# Patient Record
Sex: FEMALE | Employment: OTHER
[De-identification: names, ages, dates, MRNs, and addresses within clinical notes are randomized per-mention and may not be internally consistent; named-entity substitution may affect disease eponyms.]

---

## 2024-06-20 ENCOUNTER — APPOINTMENT (OUTPATIENT)
Facility: HOSPITAL | Age: 89
DRG: 065 | End: 2024-06-20
Payer: MEDICARE

## 2024-06-20 ENCOUNTER — HOSPITAL ENCOUNTER (EMERGENCY)
Facility: HOSPITAL | Age: 89
Discharge: HOME | DRG: 065 | End: 2024-06-20
Attending: EMERGENCY MEDICINE
Payer: MEDICARE

## 2024-06-20 VITALS
DIASTOLIC BLOOD PRESSURE: 65 MMHG | RESPIRATION RATE: 27 BRPM | TEMPERATURE: 97.9 F | OXYGEN SATURATION: 95 % | HEART RATE: 60 BPM | WEIGHT: 117 LBS | BODY MASS INDEX: 27.08 KG/M2 | HEIGHT: 55 IN | SYSTOLIC BLOOD PRESSURE: 194 MMHG

## 2024-06-20 DIAGNOSIS — S00.91XA ABRASION OF HEAD, INITIAL ENCOUNTER: ICD-10-CM

## 2024-06-20 DIAGNOSIS — E86.0 DEHYDRATION: ICD-10-CM

## 2024-06-20 DIAGNOSIS — S09.90XA CLOSED HEAD INJURY, INITIAL ENCOUNTER: Primary | ICD-10-CM

## 2024-06-20 DIAGNOSIS — I10 ESSENTIAL HYPERTENSION: ICD-10-CM

## 2024-06-20 LAB
ALBUMIN SERPL-MCNC: 3.5 G/DL (ref 3.5–5)
ALBUMIN/GLOB SERPL: 0.8 (ref 1.1–2.2)
ALP SERPL-CCNC: 64 U/L (ref 45–117)
ALT SERPL-CCNC: 20 U/L (ref 12–78)
ANION GAP SERPL CALC-SCNC: 5 MMOL/L (ref 5–15)
APPEARANCE UR: CLEAR
AST SERPL-CCNC: 19 U/L (ref 15–37)
BACTERIA URNS QL MICRO: NEGATIVE /HPF
BASOPHILS # BLD: 0.1 K/UL (ref 0–0.1)
BASOPHILS NFR BLD: 1 % (ref 0–1)
BILIRUB SERPL-MCNC: 0.4 MG/DL (ref 0.2–1)
BILIRUB UR QL: NEGATIVE
BUN SERPL-MCNC: 30 MG/DL (ref 6–20)
BUN/CREAT SERPL: 21 (ref 12–20)
CALCIUM SERPL-MCNC: 9.5 MG/DL (ref 8.5–10.1)
CHLORIDE SERPL-SCNC: 102 MMOL/L (ref 97–108)
CO2 SERPL-SCNC: 29 MMOL/L (ref 21–32)
COLOR UR: ABNORMAL
CREAT SERPL-MCNC: 1.42 MG/DL (ref 0.55–1.02)
DIFFERENTIAL METHOD BLD: ABNORMAL
EOSINOPHIL # BLD: 0.1 K/UL (ref 0–0.4)
EOSINOPHIL NFR BLD: 1 % (ref 0–7)
EPITH CASTS URNS QL MICRO: ABNORMAL /LPF
ERYTHROCYTE [DISTWIDTH] IN BLOOD BY AUTOMATED COUNT: 13.3 % (ref 11.5–14.5)
GLOBULIN SER CALC-MCNC: 4.3 G/DL (ref 2–4)
GLUCOSE SERPL-MCNC: 121 MG/DL (ref 65–100)
GLUCOSE UR STRIP.AUTO-MCNC: NEGATIVE MG/DL
HCT VFR BLD AUTO: 36.3 % (ref 35–47)
HGB BLD-MCNC: 11.8 G/DL (ref 11.5–16)
HGB UR QL STRIP: ABNORMAL
HYALINE CASTS URNS QL MICRO: ABNORMAL /LPF (ref 0–2)
IMM GRANULOCYTES # BLD AUTO: 0.1 K/UL (ref 0–0.04)
IMM GRANULOCYTES NFR BLD AUTO: 1 % (ref 0–0.5)
KETONES UR QL STRIP.AUTO: NEGATIVE MG/DL
LEUKOCYTE ESTERASE UR QL STRIP.AUTO: NEGATIVE
LYMPHOCYTES # BLD: 1 K/UL (ref 0.8–3.5)
LYMPHOCYTES NFR BLD: 13 % (ref 12–49)
MCH RBC QN AUTO: 28.2 PG (ref 26–34)
MCHC RBC AUTO-ENTMCNC: 32.5 G/DL (ref 30–36.5)
MCV RBC AUTO: 86.6 FL (ref 80–99)
MONOCYTES # BLD: 0.6 K/UL (ref 0–1)
MONOCYTES NFR BLD: 7 % (ref 5–13)
NEUTS SEG # BLD: 6.3 K/UL (ref 1.8–8)
NEUTS SEG NFR BLD: 77 % (ref 32–75)
NITRITE UR QL STRIP.AUTO: NEGATIVE
NRBC # BLD: 0 K/UL (ref 0–0.01)
NRBC BLD-RTO: 0 PER 100 WBC
PH UR STRIP: 7 (ref 5–8)
PLATELET # BLD AUTO: 298 K/UL (ref 150–400)
PMV BLD AUTO: 9.5 FL (ref 8.9–12.9)
POTASSIUM SERPL-SCNC: 4.4 MMOL/L (ref 3.5–5.1)
PROT SERPL-MCNC: 7.8 G/DL (ref 6.4–8.2)
PROT UR STRIP-MCNC: ABNORMAL MG/DL
RBC # BLD AUTO: 4.19 M/UL (ref 3.8–5.2)
RBC #/AREA URNS HPF: ABNORMAL /HPF (ref 0–5)
SODIUM SERPL-SCNC: 136 MMOL/L (ref 136–145)
SP GR UR REFRACTOMETRY: 1.01
URINE CULTURE IF INDICATED: ABNORMAL
UROBILINOGEN UR QL STRIP.AUTO: 0.2 EU/DL (ref 0.2–1)
WBC # BLD AUTO: 8.1 K/UL (ref 3.6–11)
WBC URNS QL MICRO: ABNORMAL /HPF (ref 0–4)

## 2024-06-20 PROCEDURE — 85025 COMPLETE CBC W/AUTO DIFF WBC: CPT

## 2024-06-20 PROCEDURE — 70450 CT HEAD/BRAIN W/O DYE: CPT

## 2024-06-20 PROCEDURE — 81001 URINALYSIS AUTO W/SCOPE: CPT

## 2024-06-20 PROCEDURE — 80053 COMPREHEN METABOLIC PANEL: CPT

## 2024-06-20 PROCEDURE — 96361 HYDRATE IV INFUSION ADD-ON: CPT

## 2024-06-20 PROCEDURE — 6370000000 HC RX 637 (ALT 250 FOR IP): Performed by: EMERGENCY MEDICINE

## 2024-06-20 PROCEDURE — 36415 COLL VENOUS BLD VENIPUNCTURE: CPT

## 2024-06-20 PROCEDURE — 2580000003 HC RX 258: Performed by: EMERGENCY MEDICINE

## 2024-06-20 PROCEDURE — 96360 HYDRATION IV INFUSION INIT: CPT

## 2024-06-20 PROCEDURE — 99284 EMERGENCY DEPT VISIT MOD MDM: CPT

## 2024-06-20 RX ORDER — IBUPROFEN 600 MG/1
600 TABLET ORAL
Status: COMPLETED | OUTPATIENT
Start: 2024-06-20 | End: 2024-06-20

## 2024-06-20 RX ORDER — 0.9 % SODIUM CHLORIDE 0.9 %
500 INTRAVENOUS SOLUTION INTRAVENOUS ONCE
Status: COMPLETED | OUTPATIENT
Start: 2024-06-20 | End: 2024-06-20

## 2024-06-20 RX ADMIN — SODIUM CHLORIDE 500 ML: 9 INJECTION, SOLUTION INTRAVENOUS at 14:38

## 2024-06-20 RX ADMIN — IBUPROFEN 600 MG: 600 TABLET, FILM COATED ORAL at 14:39

## 2024-06-20 ASSESSMENT — PAIN - FUNCTIONAL ASSESSMENT: PAIN_FUNCTIONAL_ASSESSMENT: 0-10

## 2024-06-20 ASSESSMENT — PAIN SCALES - GENERAL: PAINLEVEL_OUTOF10: 4

## 2024-06-20 NOTE — ED NOTES
Patient discharged from the ED by Adilene MAJANO. Diagnosis, medications, precautions and follow-ups were reviewed with the patient/family. Questions were asked and answered prior to departure. Patient departed the ED via wheelchair and was accompanied by daughter.

## 2024-06-20 NOTE — ED PROVIDER NOTES
[facility] EMERGENCY DEPT  EMERGENCY DEPARTMENT ENCOUNTER    Patient Name: Jeni Melara  MRN: [id number]  YOB: 1928  Provider: Yaakov Head MD  PCP: Unknown, Provider, APRN - NP  Time/Date of evaluation: 2:17 PM EDT on 6/20/24    History of Presenting Illness     Chief Complaint   Patient presents with    Fall     Pt BIBEMS with a cc of fall this morning; pt hit head while falling and complaining of right knee pain; denies blood thinners      History Provided by: Patient and patient's granddaughter    History is limited by: Nothing    HISTORY (Narrative):   Jeni Melara is a 89 y.o. female with a PMHX of hypertension  who presents to the emergency department (room 9) by EMS C/O ground-level fall that occurred this morning.  Patient was out on her balcony eating her birds and fell and hit her forehead.  She denies any loss of consciousness.  She also complains of right knee pain but denies any wounds.  She takes her olmesartan and trazodone as prescribed.  She does not have a primary care but has an appointment in September to see a new PCP.    Nursing Notes were all reviewed and agreed with or any disagreements were addressed in the HPI.    Past History     PAST MEDICAL HISTORY:  No past medical history on file.    PAST SURGICAL HISTORY:  No past surgical history on file.    FAMILY HISTORY:  No family history on file.    SOCIAL HISTORY:       MEDICATIONS:  No current facility-administered medications on file prior to encounter.     No current outpatient medications on file prior to encounter.       ALLERGIES:  No Known Allergies    SOCIAL DETERMINANTS OF HEALTH:  Social Determinants of Health     Tobacco Use: Not on file   Alcohol Use: Not on file   Financial Resource Strain: Not on file   Food Insecurity: Not on file   Transportation Needs: Not on file   Physical Activity: Not on file   Stress: Not on file   Social Connections: Not on file   Intimate Partner Violence: Not on file   Depression: Not on file  MD    DISCUSSION:  This appears to be a severe conditon.  This appears to be an acute condition.    89 y.o. female presents after a ground-level fall at [facility] (assisted living facility).  Patient complains of mild head pain and right knee pain secondary to her ground-level fall.  She states she was outside putting bird seed in her bird feeder when she fell.  She denies any chest pain, shortness of breath, palpitations, or any prodromal symptoms.  Given her age and elevated blood pressure readings, will obtain a CBC, CMP, and urinalysis to assess for anemia, infectious etiologies, dehydration/acute renal injury, and UTI respectively.  Will also obtain a CT of the head given her hematoma and reported fall.  Will treat with IV fluids and Motrin and reassess.  The decision to perform testing and results were discussed with the patient and granddaughter . I discussed each of these tests and considerations with the patient and granddaughter . Patient and granddaughter feel that she is well enough for discharge and agree with the plan of discharge and outpatient follow-up.    ADDITIONAL CONSIDERATIONS:  Medications considered in the ED but not provided include: IV antihypertensives because patient states she had taken her blood pressure medications earlier today and refused any additional blood pressure medications in the ED.  Admission was considered, but patient refused because she felt well enough for discharge.  With a negative workup, her granddaughter feels comfortable taking her home and both express understanding of return precautions.  Procedures/Critical Care     Procedures    ED FINAL IMPRESSION     1. Closed head injury, initial encounter    2. Dehydration    3. Abrasion of head, initial encounter    4. Essential hypertension      DISPOSITION/PLAN     DISPOSITION Decision To Discharge 06/20/2024 04:22:01 PM   Discharge Note: The patient is stable for discharge home. The signs, symptoms, diagnosis, and

## 2024-06-20 NOTE — ED NOTES
Pt arrives to ED via EMS with a cc of head pain, right hematoma and right knee pain secondary to GLF; pt states she was outside feeding the birds when she started to feel different and fell; pt lives at [facility]; pt is alert and oriented x 4, resting in stretcher with side rails up and call bell within reach. Granddaughter at bedside.

## 2024-06-21 ENCOUNTER — HOSPITAL ENCOUNTER (INPATIENT)
Facility: HOSPITAL | Age: 89
LOS: 3 days | Discharge: NURSING, REHAB OR LONG-TERM CARE | DRG: 065 | End: 2024-06-26
Attending: STUDENT IN AN ORGANIZED HEALTH CARE EDUCATION/TRAINING PROGRAM | Admitting: INTERNAL MEDICINE
Payer: MEDICARE

## 2024-06-21 ENCOUNTER — APPOINTMENT (OUTPATIENT)
Facility: HOSPITAL | Age: 89
DRG: 065 | End: 2024-06-21
Payer: MEDICARE

## 2024-06-21 DIAGNOSIS — R53.1 GENERAL WEAKNESS: ICD-10-CM

## 2024-06-21 DIAGNOSIS — I63.9 CEREBROVASCULAR ACCIDENT (CVA), UNSPECIFIED MECHANISM (HCC): Primary | ICD-10-CM

## 2024-06-21 PROBLEM — R54 AGE-RELATED PHYSICAL DEBILITY: Status: ACTIVE | Noted: 2024-06-21

## 2024-06-21 LAB
ALBUMIN SERPL-MCNC: 3.3 G/DL (ref 3.5–5)
ALBUMIN/GLOB SERPL: 0.7 (ref 1.1–2.2)
ALP SERPL-CCNC: 60 U/L (ref 45–117)
ALT SERPL-CCNC: 20 U/L (ref 12–78)
ANION GAP SERPL CALC-SCNC: 5 MMOL/L (ref 5–15)
APPEARANCE UR: CLEAR
AST SERPL-CCNC: 18 U/L (ref 15–37)
BACTERIA URNS QL MICRO: NEGATIVE /HPF
BASOPHILS # BLD: 0.1 K/UL (ref 0–0.1)
BASOPHILS NFR BLD: 1 % (ref 0–1)
BILIRUB SERPL-MCNC: 0.5 MG/DL (ref 0.2–1)
BILIRUB UR QL: NEGATIVE
BUN SERPL-MCNC: 22 MG/DL (ref 6–20)
BUN/CREAT SERPL: 19 (ref 12–20)
CALCIUM SERPL-MCNC: 9.3 MG/DL (ref 8.5–10.1)
CHLORIDE SERPL-SCNC: 102 MMOL/L (ref 97–108)
CO2 SERPL-SCNC: 27 MMOL/L (ref 21–32)
COLOR UR: ABNORMAL
COMMENT:: NORMAL
CREAT SERPL-MCNC: 1.13 MG/DL (ref 0.55–1.02)
DIFFERENTIAL METHOD BLD: ABNORMAL
EOSINOPHIL # BLD: 0.1 K/UL (ref 0–0.4)
EOSINOPHIL NFR BLD: 1 % (ref 0–7)
EPITH CASTS URNS QL MICRO: ABNORMAL /LPF
ERYTHROCYTE [DISTWIDTH] IN BLOOD BY AUTOMATED COUNT: 13.2 % (ref 11.5–14.5)
GLOBULIN SER CALC-MCNC: 4.5 G/DL (ref 2–4)
GLUCOSE BLD STRIP.AUTO-MCNC: 127 MG/DL (ref 65–117)
GLUCOSE SERPL-MCNC: 106 MG/DL (ref 65–100)
GLUCOSE UR STRIP.AUTO-MCNC: NEGATIVE MG/DL
HCT VFR BLD AUTO: 34.7 % (ref 35–47)
HGB BLD-MCNC: 12 G/DL (ref 11.5–16)
HGB UR QL STRIP: ABNORMAL
HYALINE CASTS URNS QL MICRO: ABNORMAL /LPF (ref 0–2)
IMM GRANULOCYTES # BLD AUTO: 0.1 K/UL (ref 0–0.04)
IMM GRANULOCYTES NFR BLD AUTO: 1 % (ref 0–0.5)
KETONES UR QL STRIP.AUTO: NEGATIVE MG/DL
LEUKOCYTE ESTERASE UR QL STRIP.AUTO: NEGATIVE
LYMPHOCYTES # BLD: 1.5 K/UL (ref 0.8–3.5)
LYMPHOCYTES NFR BLD: 17 % (ref 12–49)
MAGNESIUM SERPL-MCNC: 2 MG/DL (ref 1.6–2.4)
MCH RBC QN AUTO: 28.4 PG (ref 26–34)
MCHC RBC AUTO-ENTMCNC: 34.6 G/DL (ref 30–36.5)
MCV RBC AUTO: 82.2 FL (ref 80–99)
MONOCYTES # BLD: 0.6 K/UL (ref 0–1)
MONOCYTES NFR BLD: 7 % (ref 5–13)
NEUTS SEG # BLD: 6.3 K/UL (ref 1.8–8)
NEUTS SEG NFR BLD: 74 % (ref 32–75)
NITRITE UR QL STRIP.AUTO: NEGATIVE
NRBC # BLD: 0 K/UL (ref 0–0.01)
NRBC BLD-RTO: 0 PER 100 WBC
PH UR STRIP: 7.5 (ref 5–8)
PLATELET # BLD AUTO: 280 K/UL (ref 150–400)
PMV BLD AUTO: 9.3 FL (ref 8.9–12.9)
POTASSIUM SERPL-SCNC: 4.4 MMOL/L (ref 3.5–5.1)
PROT SERPL-MCNC: 7.8 G/DL (ref 6.4–8.2)
PROT UR STRIP-MCNC: ABNORMAL MG/DL
RBC # BLD AUTO: 4.22 M/UL (ref 3.8–5.2)
RBC #/AREA URNS HPF: ABNORMAL /HPF (ref 0–5)
SERVICE CMNT-IMP: ABNORMAL
SODIUM SERPL-SCNC: 134 MMOL/L (ref 136–145)
SP GR UR REFRACTOMETRY: 1.02
SPECIMEN HOLD: NORMAL
TROPONIN I SERPL HS-MCNC: 39 NG/L (ref 0–51)
URINE CULTURE IF INDICATED: ABNORMAL
UROBILINOGEN UR QL STRIP.AUTO: 0.2 EU/DL (ref 0.2–1)
WBC # BLD AUTO: 8.5 K/UL (ref 3.6–11)
WBC URNS QL MICRO: ABNORMAL /HPF (ref 0–4)

## 2024-06-21 PROCEDURE — 36415 COLL VENOUS BLD VENIPUNCTURE: CPT

## 2024-06-21 PROCEDURE — 96361 HYDRATE IV INFUSION ADD-ON: CPT

## 2024-06-21 PROCEDURE — 85025 COMPLETE CBC W/AUTO DIFF WBC: CPT

## 2024-06-21 PROCEDURE — 93005 ELECTROCARDIOGRAM TRACING: CPT | Performed by: INTERNAL MEDICINE

## 2024-06-21 PROCEDURE — 70450 CT HEAD/BRAIN W/O DYE: CPT

## 2024-06-21 PROCEDURE — 6360000004 HC RX CONTRAST MEDICATION: Performed by: STUDENT IN AN ORGANIZED HEALTH CARE EDUCATION/TRAINING PROGRAM

## 2024-06-21 PROCEDURE — 74176 CT ABD & PELVIS W/O CONTRAST: CPT

## 2024-06-21 PROCEDURE — 71045 X-RAY EXAM CHEST 1 VIEW: CPT

## 2024-06-21 PROCEDURE — 83735 ASSAY OF MAGNESIUM: CPT

## 2024-06-21 PROCEDURE — 2580000003 HC RX 258: Performed by: NURSE PRACTITIONER

## 2024-06-21 PROCEDURE — 71275 CT ANGIOGRAPHY CHEST: CPT

## 2024-06-21 PROCEDURE — 84484 ASSAY OF TROPONIN QUANT: CPT

## 2024-06-21 PROCEDURE — G0378 HOSPITAL OBSERVATION PER HR: HCPCS

## 2024-06-21 PROCEDURE — 2580000003 HC RX 258: Performed by: STUDENT IN AN ORGANIZED HEALTH CARE EDUCATION/TRAINING PROGRAM

## 2024-06-21 PROCEDURE — 99285 EMERGENCY DEPT VISIT HI MDM: CPT

## 2024-06-21 PROCEDURE — 80053 COMPREHEN METABOLIC PANEL: CPT

## 2024-06-21 PROCEDURE — 82962 GLUCOSE BLOOD TEST: CPT

## 2024-06-21 PROCEDURE — 81001 URINALYSIS AUTO W/SCOPE: CPT

## 2024-06-21 RX ORDER — POLYETHYLENE GLYCOL 3350 17 G/17G
17 POWDER, FOR SOLUTION ORAL DAILY PRN
Status: DISCONTINUED | OUTPATIENT
Start: 2024-06-21 | End: 2024-06-22 | Stop reason: SDUPTHER

## 2024-06-21 RX ORDER — TRAZODONE HYDROCHLORIDE 50 MG/1
50 TABLET ORAL NIGHTLY
COMMUNITY

## 2024-06-21 RX ORDER — SODIUM CHLORIDE 9 MG/ML
INJECTION, SOLUTION INTRAVENOUS CONTINUOUS
Status: ACTIVE | OUTPATIENT
Start: 2024-06-21 | End: 2024-06-22

## 2024-06-21 RX ORDER — ONDANSETRON 4 MG/1
4 TABLET, ORALLY DISINTEGRATING ORAL EVERY 8 HOURS PRN
Status: DISCONTINUED | OUTPATIENT
Start: 2024-06-21 | End: 2024-06-22 | Stop reason: SDUPTHER

## 2024-06-21 RX ORDER — ACETAMINOPHEN 325 MG/1
650 TABLET ORAL EVERY 6 HOURS PRN
Status: DISCONTINUED | OUTPATIENT
Start: 2024-06-21 | End: 2024-06-26 | Stop reason: HOSPADM

## 2024-06-21 RX ORDER — GLUCAGON 1 MG/ML
1 KIT INJECTION PRN
Status: DISCONTINUED | OUTPATIENT
Start: 2024-06-21 | End: 2024-06-26 | Stop reason: HOSPADM

## 2024-06-21 RX ORDER — OLMESARTAN MEDOXOMIL 20 MG/1
20 TABLET ORAL DAILY
Status: ON HOLD | COMMUNITY
Start: 2024-06-03 | End: 2024-06-26 | Stop reason: HOSPADM

## 2024-06-21 RX ORDER — 0.9 % SODIUM CHLORIDE 0.9 %
1000 INTRAVENOUS SOLUTION INTRAVENOUS ONCE
Status: COMPLETED | OUTPATIENT
Start: 2024-06-21 | End: 2024-06-21

## 2024-06-21 RX ORDER — TRAZODONE HYDROCHLORIDE 50 MG/1
50 TABLET ORAL NIGHTLY
Status: DISCONTINUED | OUTPATIENT
Start: 2024-06-21 | End: 2024-06-26 | Stop reason: HOSPADM

## 2024-06-21 RX ORDER — SODIUM CHLORIDE 9 MG/ML
INJECTION, SOLUTION INTRAVENOUS PRN
Status: DISCONTINUED | OUTPATIENT
Start: 2024-06-21 | End: 2024-06-23 | Stop reason: SDUPTHER

## 2024-06-21 RX ORDER — DEXTROSE MONOHYDRATE 100 MG/ML
INJECTION, SOLUTION INTRAVENOUS CONTINUOUS PRN
Status: DISCONTINUED | OUTPATIENT
Start: 2024-06-21 | End: 2024-06-26 | Stop reason: HOSPADM

## 2024-06-21 RX ORDER — ACETAMINOPHEN 650 MG/1
650 SUPPOSITORY RECTAL EVERY 6 HOURS PRN
Status: DISCONTINUED | OUTPATIENT
Start: 2024-06-21 | End: 2024-06-26 | Stop reason: HOSPADM

## 2024-06-21 RX ORDER — ACETAMINOPHEN 325 MG/1
650 TABLET ORAL EVERY 8 HOURS SCHEDULED
Status: DISCONTINUED | OUTPATIENT
Start: 2024-06-21 | End: 2024-06-26 | Stop reason: HOSPADM

## 2024-06-21 RX ORDER — ENOXAPARIN SODIUM 100 MG/ML
30 INJECTION SUBCUTANEOUS DAILY
Status: DISCONTINUED | OUTPATIENT
Start: 2024-06-22 | End: 2024-06-25

## 2024-06-21 RX ORDER — ONDANSETRON 2 MG/ML
4 INJECTION INTRAMUSCULAR; INTRAVENOUS EVERY 6 HOURS PRN
Status: DISCONTINUED | OUTPATIENT
Start: 2024-06-21 | End: 2024-06-22 | Stop reason: SDUPTHER

## 2024-06-21 RX ORDER — LOSARTAN POTASSIUM 50 MG/1
50 TABLET ORAL DAILY
Status: DISCONTINUED | OUTPATIENT
Start: 2024-06-22 | End: 2024-06-22

## 2024-06-21 RX ORDER — SODIUM CHLORIDE 0.9 % (FLUSH) 0.9 %
5-40 SYRINGE (ML) INJECTION PRN
Status: DISCONTINUED | OUTPATIENT
Start: 2024-06-21 | End: 2024-06-22 | Stop reason: SDUPTHER

## 2024-06-21 RX ORDER — SODIUM CHLORIDE 0.9 % (FLUSH) 0.9 %
5-40 SYRINGE (ML) INJECTION EVERY 12 HOURS SCHEDULED
Status: DISCONTINUED | OUTPATIENT
Start: 2024-06-21 | End: 2024-06-22 | Stop reason: SDUPTHER

## 2024-06-21 RX ORDER — INSULIN LISPRO 100 [IU]/ML
0-4 INJECTION, SOLUTION INTRAVENOUS; SUBCUTANEOUS
Status: DISCONTINUED | OUTPATIENT
Start: 2024-06-22 | End: 2024-06-26 | Stop reason: HOSPADM

## 2024-06-21 RX ORDER — INSULIN LISPRO 100 [IU]/ML
0-4 INJECTION, SOLUTION INTRAVENOUS; SUBCUTANEOUS NIGHTLY
Status: DISCONTINUED | OUTPATIENT
Start: 2024-06-21 | End: 2024-06-26 | Stop reason: HOSPADM

## 2024-06-21 RX ADMIN — SODIUM CHLORIDE 1000 ML: 9 INJECTION, SOLUTION INTRAVENOUS at 18:27

## 2024-06-21 RX ADMIN — IOPAMIDOL 100 ML: 755 INJECTION, SOLUTION INTRAVENOUS at 19:03

## 2024-06-21 RX ADMIN — SODIUM CHLORIDE: 9 INJECTION, SOLUTION INTRAVENOUS at 22:29

## 2024-06-21 ASSESSMENT — LIFESTYLE VARIABLES
HOW MANY STANDARD DRINKS CONTAINING ALCOHOL DO YOU HAVE ON A TYPICAL DAY: PATIENT DOES NOT DRINK
HOW OFTEN DO YOU HAVE A DRINK CONTAINING ALCOHOL: NEVER

## 2024-06-21 NOTE — ED PROVIDER NOTES
201/80   Pulse: 66 66 68 70   Resp: 17 22 19 17   Temp:       TempSrc:       SpO2: 95% 96% 96% 94%   Weight:       Height:                Patient was given the following medications:  Medications   sodium chloride 0.9 % bolus 1,000 mL (1,000 mLs IntraVENous New Bag 6/21/24 7697)   iopamidol (ISOVUE-370) 76 % injection 100 mL (has no administration in time range)       CONSULTS: (Who and What was discussed)  None      Chronic Conditions: htn    Social Determinants affecting Dx or Tx: None  =    Records Reviewed (source and summary of external notes): Nursing Notes, Previous Radiology Studies, and Previous Laboratory Studies    CC/HPI Summary, DDx, ED Course, and Reassessment: Patient presenting with nonspecific, nonfocal weakness, generalized fatigue. DDx: medication toxicity, infection, anemia, electrolyte/metabolic anomoly, hypercapnea, stroke/bleed/mass, dehydration, illicit drug intoxication. Will obtain labwork, UA, EKG and CT imaging of the head, Chest xray.  Labs CT abdomen given her complaint of abdominal distention although suspect this is likely a chronic issue.  Will admit to hospitalist due to difficulty ambulating, generalized weakness.    ED Course as of 06/23/24 1154   Fri Jun 21, 2024 2157 Discussed with hospitalist. Workup thus far unremarkable other than incidental thoracoabdominal anuersym, no evidence of dissection or rupture. Updated family. Derian lives in independent living and family uncofmortable with her going back at this time.  [NM]      ED Course User Index  [NM] Rhys Dan, DO       Disposition Considerations (Tests not done, Shared Decision Making, Pt Expectation of Test or Tx.):      FINAL IMPRESSION     1. Cerebrovascular accident (CVA), unspecified mechanism (HCC)    2. General weakness          DISPOSITION/PLAN   DISPOSITION        Admit Note: Pt is being admitted by Dr Khan. The results of their tests and reason(s) for their admission have been discussed with pt  and/or available family. They convey agreement and understanding for the need to be admitted and for the admission diagnosis.     PATIENT REFERRED TO:  No follow-up provider specified.       DISCHARGE MEDICATIONS:     Medication List      You have not been prescribed any medications.           DISCONTINUED MEDICATIONS:  There are no discharge medications for this patient.      I am the Primary Clinician of Record.   Rhys Dan DO (electronically signed)      (Please note that parts of this dictation were completed with voice recognition software. Quite often unanticipated grammatical, syntax, homophones, and other interpretive errors are inadvertently transcribed by the computer software. Please disregards these errors. Please excuse any errors that have escaped final proofreading.)         Rhys Dan DO  06/23/24 1156

## 2024-06-22 ENCOUNTER — APPOINTMENT (OUTPATIENT)
Facility: HOSPITAL | Age: 89
DRG: 065 | End: 2024-06-22
Payer: MEDICARE

## 2024-06-22 LAB
ALBUMIN SERPL-MCNC: 3 G/DL (ref 3.5–5)
ALBUMIN/GLOB SERPL: 0.8 (ref 1.1–2.2)
ALP SERPL-CCNC: 51 U/L (ref 45–117)
ALT SERPL-CCNC: 16 U/L (ref 12–78)
ANION GAP SERPL CALC-SCNC: 6 MMOL/L (ref 5–15)
AST SERPL-CCNC: 16 U/L (ref 15–37)
BASOPHILS # BLD: 0 K/UL (ref 0–0.1)
BASOPHILS NFR BLD: 1 % (ref 0–1)
BILIRUB SERPL-MCNC: 0.7 MG/DL (ref 0.2–1)
BUN SERPL-MCNC: 20 MG/DL (ref 6–20)
BUN/CREAT SERPL: 18 (ref 12–20)
CALCIUM SERPL-MCNC: 8.5 MG/DL (ref 8.5–10.1)
CHLORIDE SERPL-SCNC: 107 MMOL/L (ref 97–108)
CO2 SERPL-SCNC: 24 MMOL/L (ref 21–32)
CREAT SERPL-MCNC: 1.09 MG/DL (ref 0.55–1.02)
DIFFERENTIAL METHOD BLD: ABNORMAL
EOSINOPHIL # BLD: 0.1 K/UL (ref 0–0.4)
EOSINOPHIL NFR BLD: 1 % (ref 0–7)
ERYTHROCYTE [DISTWIDTH] IN BLOOD BY AUTOMATED COUNT: 13.2 % (ref 11.5–14.5)
EST. AVERAGE GLUCOSE BLD GHB EST-MCNC: 108 MG/DL
GLOBULIN SER CALC-MCNC: 4 G/DL (ref 2–4)
GLUCOSE BLD STRIP.AUTO-MCNC: 103 MG/DL (ref 65–117)
GLUCOSE BLD STRIP.AUTO-MCNC: 108 MG/DL (ref 65–117)
GLUCOSE BLD STRIP.AUTO-MCNC: 114 MG/DL (ref 65–117)
GLUCOSE BLD STRIP.AUTO-MCNC: 135 MG/DL (ref 65–117)
GLUCOSE SERPL-MCNC: 109 MG/DL (ref 65–100)
HBA1C MFR BLD: 5.4 % (ref 4–5.6)
HCT VFR BLD AUTO: 32.4 % (ref 35–47)
HGB BLD-MCNC: 10.9 G/DL (ref 11.5–16)
IMM GRANULOCYTES # BLD AUTO: 0.1 K/UL (ref 0–0.04)
IMM GRANULOCYTES NFR BLD AUTO: 1 % (ref 0–0.5)
LYMPHOCYTES # BLD: 1.9 K/UL (ref 0.8–3.5)
LYMPHOCYTES NFR BLD: 24 % (ref 12–49)
MCH RBC QN AUTO: 28.5 PG (ref 26–34)
MCHC RBC AUTO-ENTMCNC: 33.6 G/DL (ref 30–36.5)
MCV RBC AUTO: 84.6 FL (ref 80–99)
MONOCYTES # BLD: 0.7 K/UL (ref 0–1)
MONOCYTES NFR BLD: 8 % (ref 5–13)
NEUTS SEG # BLD: 5 K/UL (ref 1.8–8)
NEUTS SEG NFR BLD: 65 % (ref 32–75)
NRBC # BLD: 0 K/UL (ref 0–0.01)
NRBC BLD-RTO: 0 PER 100 WBC
PLATELET # BLD AUTO: 256 K/UL (ref 150–400)
PMV BLD AUTO: 9.5 FL (ref 8.9–12.9)
POTASSIUM SERPL-SCNC: 4 MMOL/L (ref 3.5–5.1)
PROT SERPL-MCNC: 7 G/DL (ref 6.4–8.2)
RBC # BLD AUTO: 3.83 M/UL (ref 3.8–5.2)
SERVICE CMNT-IMP: ABNORMAL
SERVICE CMNT-IMP: NORMAL
SODIUM SERPL-SCNC: 137 MMOL/L (ref 136–145)
WBC # BLD AUTO: 7.7 K/UL (ref 3.6–11)

## 2024-06-22 PROCEDURE — G0378 HOSPITAL OBSERVATION PER HR: HCPCS

## 2024-06-22 PROCEDURE — 73030 X-RAY EXAM OF SHOULDER: CPT

## 2024-06-22 PROCEDURE — 6370000000 HC RX 637 (ALT 250 FOR IP): Performed by: NURSE PRACTITIONER

## 2024-06-22 PROCEDURE — 84439 ASSAY OF FREE THYROXINE: CPT

## 2024-06-22 PROCEDURE — 84443 ASSAY THYROID STIM HORMONE: CPT

## 2024-06-22 PROCEDURE — 82962 GLUCOSE BLOOD TEST: CPT

## 2024-06-22 PROCEDURE — 80053 COMPREHEN METABOLIC PANEL: CPT

## 2024-06-22 PROCEDURE — 97535 SELF CARE MNGMENT TRAINING: CPT

## 2024-06-22 PROCEDURE — 97162 PT EVAL MOD COMPLEX 30 MIN: CPT

## 2024-06-22 PROCEDURE — 6370000000 HC RX 637 (ALT 250 FOR IP): Performed by: STUDENT IN AN ORGANIZED HEALTH CARE EDUCATION/TRAINING PROGRAM

## 2024-06-22 PROCEDURE — 86376 MICROSOMAL ANTIBODY EACH: CPT

## 2024-06-22 PROCEDURE — 97530 THERAPEUTIC ACTIVITIES: CPT

## 2024-06-22 PROCEDURE — 99222 1ST HOSP IP/OBS MODERATE 55: CPT | Performed by: INTERNAL MEDICINE

## 2024-06-22 PROCEDURE — 83036 HEMOGLOBIN GLYCOSYLATED A1C: CPT

## 2024-06-22 PROCEDURE — 70450 CT HEAD/BRAIN W/O DYE: CPT

## 2024-06-22 PROCEDURE — 85025 COMPLETE CBC W/AUTO DIFF WBC: CPT

## 2024-06-22 PROCEDURE — 6360000002 HC RX W HCPCS: Performed by: NURSE PRACTITIONER

## 2024-06-22 PROCEDURE — 2580000003 HC RX 258: Performed by: STUDENT IN AN ORGANIZED HEALTH CARE EDUCATION/TRAINING PROGRAM

## 2024-06-22 PROCEDURE — 70551 MRI BRAIN STEM W/O DYE: CPT

## 2024-06-22 PROCEDURE — G0378 HOSPITAL OBSERVATION PER HR: HCPCS | Performed by: INTERNAL MEDICINE

## 2024-06-22 PROCEDURE — 36415 COLL VENOUS BLD VENIPUNCTURE: CPT

## 2024-06-22 PROCEDURE — 92610 EVALUATE SWALLOWING FUNCTION: CPT | Performed by: SPEECH-LANGUAGE PATHOLOGIST

## 2024-06-22 PROCEDURE — 97112 NEUROMUSCULAR REEDUCATION: CPT

## 2024-06-22 PROCEDURE — 96372 THER/PROPH/DIAG INJ SC/IM: CPT

## 2024-06-22 PROCEDURE — 97166 OT EVAL MOD COMPLEX 45 MIN: CPT

## 2024-06-22 RX ORDER — CLOPIDOGREL BISULFATE 75 MG/1
75 TABLET ORAL DAILY
Status: DISCONTINUED | OUTPATIENT
Start: 2024-06-23 | End: 2024-06-22

## 2024-06-22 RX ORDER — CLOPIDOGREL BISULFATE 75 MG/1
300 TABLET ORAL DAILY
Status: COMPLETED | OUTPATIENT
Start: 2024-06-22 | End: 2024-06-22

## 2024-06-22 RX ORDER — ASPIRIN 300 MG/1
300 SUPPOSITORY RECTAL DAILY
Status: DISCONTINUED | OUTPATIENT
Start: 2024-06-22 | End: 2024-06-25

## 2024-06-22 RX ORDER — SODIUM CHLORIDE 9 MG/ML
INJECTION, SOLUTION INTRAVENOUS PRN
Status: DISCONTINUED | OUTPATIENT
Start: 2024-06-22 | End: 2024-06-26 | Stop reason: HOSPADM

## 2024-06-22 RX ORDER — SODIUM CHLORIDE 0.9 % (FLUSH) 0.9 %
5-40 SYRINGE (ML) INJECTION PRN
Status: DISCONTINUED | OUTPATIENT
Start: 2024-06-22 | End: 2024-06-26 | Stop reason: HOSPADM

## 2024-06-22 RX ORDER — ASPIRIN 81 MG/1
81 TABLET, CHEWABLE ORAL DAILY
Status: DISCONTINUED | OUTPATIENT
Start: 2024-06-22 | End: 2024-06-25

## 2024-06-22 RX ORDER — ONDANSETRON 4 MG/1
4 TABLET, ORALLY DISINTEGRATING ORAL EVERY 8 HOURS PRN
Status: DISCONTINUED | OUTPATIENT
Start: 2024-06-22 | End: 2024-06-26 | Stop reason: HOSPADM

## 2024-06-22 RX ORDER — ONDANSETRON 2 MG/ML
4 INJECTION INTRAMUSCULAR; INTRAVENOUS EVERY 6 HOURS PRN
Status: DISCONTINUED | OUTPATIENT
Start: 2024-06-22 | End: 2024-06-26 | Stop reason: HOSPADM

## 2024-06-22 RX ORDER — CLONIDINE HYDROCHLORIDE 0.1 MG/1
0.1 TABLET ORAL ONCE
Status: COMPLETED | OUTPATIENT
Start: 2024-06-22 | End: 2024-06-22

## 2024-06-22 RX ORDER — ROSUVASTATIN CALCIUM 20 MG/1
40 TABLET, COATED ORAL NIGHTLY
Status: DISCONTINUED | OUTPATIENT
Start: 2024-06-22 | End: 2024-06-26 | Stop reason: HOSPADM

## 2024-06-22 RX ORDER — ASPIRIN 81 MG/1
81 TABLET, CHEWABLE ORAL DAILY
Status: DISCONTINUED | OUTPATIENT
Start: 2024-06-22 | End: 2024-06-22

## 2024-06-22 RX ORDER — POLYETHYLENE GLYCOL 3350 17 G/17G
17 POWDER, FOR SOLUTION ORAL DAILY PRN
Status: DISCONTINUED | OUTPATIENT
Start: 2024-06-22 | End: 2024-06-26 | Stop reason: HOSPADM

## 2024-06-22 RX ORDER — LOSARTAN POTASSIUM 50 MG/1
50 TABLET ORAL DAILY
Status: DISCONTINUED | OUTPATIENT
Start: 2024-06-22 | End: 2024-06-25

## 2024-06-22 RX ORDER — SODIUM CHLORIDE 0.9 % (FLUSH) 0.9 %
5-40 SYRINGE (ML) INJECTION EVERY 12 HOURS SCHEDULED
Status: DISCONTINUED | OUTPATIENT
Start: 2024-06-22 | End: 2024-06-26 | Stop reason: HOSPADM

## 2024-06-22 RX ADMIN — CLONIDINE HYDROCHLORIDE 0.1 MG: 0.1 TABLET ORAL at 04:19

## 2024-06-22 RX ADMIN — ACETAMINOPHEN 650 MG: 325 TABLET ORAL at 14:53

## 2024-06-22 RX ADMIN — ENOXAPARIN SODIUM 30 MG: 100 INJECTION SUBCUTANEOUS at 14:50

## 2024-06-22 RX ADMIN — LOSARTAN POTASSIUM 50 MG: 50 TABLET, FILM COATED ORAL at 01:38

## 2024-06-22 RX ADMIN — SODIUM CHLORIDE, PRESERVATIVE FREE 10 ML: 5 INJECTION INTRAVENOUS at 14:52

## 2024-06-22 RX ADMIN — CLOPIDOGREL BISULFATE 300 MG: 75 TABLET ORAL at 14:47

## 2024-06-22 RX ADMIN — ACETAMINOPHEN 650 MG: 325 TABLET ORAL at 20:44

## 2024-06-22 RX ADMIN — ROSUVASTATIN CALCIUM 40 MG: 40 TABLET, FILM COATED ORAL at 20:45

## 2024-06-22 RX ADMIN — ASPIRIN 81 MG: 81 TABLET, CHEWABLE ORAL at 14:47

## 2024-06-22 RX ADMIN — SERTRALINE HYDROCHLORIDE 50 MG: 50 TABLET ORAL at 14:49

## 2024-06-22 RX ADMIN — TRAZODONE HYDROCHLORIDE 50 MG: 50 TABLET ORAL at 20:44

## 2024-06-22 RX ADMIN — SODIUM CHLORIDE, PRESERVATIVE FREE 10 ML: 5 INJECTION INTRAVENOUS at 20:45

## 2024-06-22 ASSESSMENT — PAIN SCALES - GENERAL
PAINLEVEL_OUTOF10: 0
PAINLEVEL_OUTOF10: 3

## 2024-06-22 ASSESSMENT — PAIN DESCRIPTION - LOCATION: LOCATION: BACK

## 2024-06-22 ASSESSMENT — PAIN DESCRIPTION - DESCRIPTORS: DESCRIPTORS: ACHING

## 2024-06-22 ASSESSMENT — PAIN DESCRIPTION - ORIENTATION: ORIENTATION: RIGHT

## 2024-06-22 NOTE — PLAN OF CARE
Speech LAnguage Pathology EVALUATION    Patient: Jeni Melara (89 y.o. female)  Date: 6/22/2024  Primary Diagnosis: Age-related physical debility [R54]       Precautions:  Fall Risk, Bed Alarm                  ASSESSMENT :  Patient presents with moderate oral dysphagia characterized by poor mastication of softened cracker with bolus spit out mostly whole after prolonged attempt at chewing.  Patient took scant bites of applesauce by spoon and demonstrated slow but complete oral clearance. Pharyngeal swallow suspected to be functional for age with suspected timely swallow initiation and functional hyolaryngeal elevation/excursion.  No s/s of aspiration.      Patient noted to have some intermittent word finding difficulty and slurred speech in conversation that was variable throughout the session.  Initially worse but patient in a deep sleep when SLP arrived to bedside.  Improved as session progressed.  Patient also appearing intermittently confused.  Per discussion with nsg, variability in function throughout the day today as well.  Son arrived at end of session and reports that both her word finding and slurred speech do occur intermittently at baseline, specifically when she is not feeling well.  Will follow for possible language/motor speech evaluation pending MRI results as it appears many of her current deficits are present at baseline and are variable throughout the day.     Patient will benefit from skilled intervention to address the above impairments.     PLAN :  Recommendations and Planned Interventions:  Diet: Puree and thin liquids  --Will follow for diet tolerance and upgrade as appropriate  --will follow to determine need for formal motor speech and language evaluation pending MRI results.          Acute SLP Services: Yes, patient will be followed by speech-language pathology 3x/week to address goals. Patient's rehabilitation potential is considered to be Fair.    Discharge Recommendations: Continue to

## 2024-06-22 NOTE — CONSULTS
negative  Cardiovascular, heart: negative  Respiratory: negative  Gastrointestinal: negative  Genitourinary: negative  Musculoskeletal: negative  Skin and integumentary: negative  Psychiatric: negative  Endocrine: negative  Neurological: negative, except for HPI  Hematologic/lymphatic: negative  Allergy/immunology: negative    []Unable to obtain  ROS due to  []mental status change  []sedated   []intubated      PHYSICAL EXAMINATION:   BP (!) 175/64   Pulse 58   Temp 97.5 °F (36.4 °C)   Resp 16   Ht 1.397 m (4' 7\")   Wt 58.4 kg (128 lb 12 oz)   SpO2 94%   BMI 29.92 kg/m²     Physical Exam:  General:  no acute distress  Neck: supple no mass   Lungs: Comfortable on room air  Heart: Well-perfused  Lower extremity: no edema    Neurological exam:  Mental Status: Awake, alert, oriented to person, place and time  Attention and Concentration intact    Speech and Language: edentulous, slurred speech. Able to name, repeat and follow commands   Fund of knowledge was preserved    Cranial nerves: II-XII:  Pupils equal and reactive, visual fields intact by confrontation   Extraocular movements intact, no evidence of nystagmus or ptosis   Facial sensation intact to light touch  Right facial droop  Shoulder shrug symmetric and strong   Tongue protrusion full and midline    Motor:   Normal tone throughout, unable to test the right arm given significant pain on touch.    Strength testing:   deltoid triceps biceps Wrist ext. Wrist flex. intrinsics   Right - - - - - 4   Left 5 5 5 5 5 5   Right finger ext 4/5, right finger flexion 5/5   Left ext and flexion 5/5   Hip flex. Hip ext. Knee ext.  Knee flex Dorsi flex Plantar flex   Right  4+ NT 5 5 5 5   Left  4+ NT 5 5 5 5       Sensory: Intact to light touch and pinprick throughout.  Reflexes:   Biceps Triceps  Brachiorad Patellar Achilles Plantar Hoffmans   Right  2 2 2 2 2 Down NT   Left  2 2 2 2 2 Down NT     Cerebellar testing: Unable to test the right upper extremity.  Left  upper extremity without any deficit.  No tremor noted.      Data:     Lab Results   Component Value Date/Time     06/22/2024 04:14 AM    K 4.0 06/22/2024 04:14 AM     06/22/2024 04:14 AM    BUN 20 06/22/2024 04:14 AM    WBC 7.7 06/22/2024 04:14 AM    HCT 32.4 06/22/2024 04:14 AM    HGB 10.9 06/22/2024 04:14 AM     06/22/2024 04:14 AM       Imaging:  Reviewed independently.    IMPRESSION/RECOMMENDATIONS:  Jeni Melara is a 89 y.o. female who presents with ground-level fall who had a stroke code called overnight due to question of right facial droop and not moving her right arm.  She does have slurred speech however she does not have any dentures placed.  Unclear if this is her baseline or not.  According to patient's nurse, family states that this is her baseline.  Patient denies any prior history of stroke.  She does have significant right shoulder pain and bruising on her right arm and given the ground-level fall I would recommend some form of imaging of her entire arm given that she has pain limiting weakness.  Would also be important to rule out stroke as it is unclear what is new and what is her baseline and given that the patient has very severely slurred speech due to being edentulous as well as being a poor historian.     # right facial droop, right arm weakness (pain limiting), slurred speech (edentulous)   - head CT stable, pending brain MRI. Will need vessel imaging if positive for acute stroke as well as rest of stroke work up  - may continue asa for now, will await imaging for further adjustment.       Thank you very much for this consultation.  Neurology will continue to follow.    I spent 55 minutes providing care to this acutely ill inpatient with > 50% of the time counseling as well as reviewing the patient's chart, notes, labs, medications and preparing documentation along with assisting in the coordination of care of the patient on the patient's hospital floor/unit.       Kimberley

## 2024-06-22 NOTE — PROGRESS NOTES
RAPID RESPONSE NOTE:    BACKGROUND/ SITUATION:  89-year-old  female with pertinent past medical history of essential hypertension, insomnia, MDD/PEBBLES, chronic pain admitted to my service by GENARO Weeks overnight with complaints of generalized debility and deconditioning.  Presented to emergency department from independent living ([facility]) after mechanical fall the day before and today with inability to rise out of a chair.  Rapid response called overnight with stroke like symptoms and complaints of slurred speech-rapid canceled by GENARO Weeks after evaluation and patient found to be at baseline condition with slurred speech secondary to edentulous state and unchanged from time of admission.  I was called later that night to reevaluate patient for new strokelike symptoms - dysarthria, R neglect/ flacid paresis, R facial droop.    LKW - 0400    FINDINGS:  Elderly frail-appearing  female sleeping at time of my arrival.  Difficult to arouse, but once awakened appeared alert, but unable to speak  Pupils equal and reactive to light  Right hemiplegia and neglect appreciated RUE greater than RLE  Right facial droop  Point-of-care blood glucose 108  Blood pressure systolic 130 (previously 190s recently administered clonidine)      ASSESSMENT    Concern for CVA    INTERVENTION/ RESPONSE  Stroke alert  Point-of-care blood glucose checked and WNL  Bolus 500 cc to address significant drop in BP  As bolus provided mentation appeared to improve and patient began speaking.  Continues to have right arm flaccid paralysis, but grabs her arm and attempts to lift it so does not appear to have component of neglect  CT neuro protocol ordered  Teleneurology consulted  Load ASA and plavix  MRI brain  Neuro consult  Permissive HTN  Transfer to Neuro unit  Spoke with granddaughter, who supplies that her grandmother has been having these symptoms intermittently for weeks-months and she believes they are behavioral in nature.

## 2024-06-22 NOTE — PROGRESS NOTES
-Please complete MRI History and Safety Screening Form.    - Patient cannot be scanned until this form is completed and reviewed in MRI to ensure patient is SAFE and eligible for MRI.  - CALL MRI when this has been successfully completed at [phone].

## 2024-06-22 NOTE — PROGRESS NOTES
End of Shift Note    Bedside shift change report given to  Daren  (oncoming nurse) by LAURYN MUKHERJEE RN .        Shift worked:  7a-7p   Shift summary and any significant changes:     Arrived from CT from rapid response on Med surg unit. Alert and orientdd speech slurred but understandable. Difficulty holding rt arm up due to pain in shoulder  strong and equal. RT Shoulder xray done MRI done.Sen by Neurology, Speech PT and OT       Concerns for physician to address:     Zone phone for oncoming shift:        Patient Information  Jeni Melara  89 y.o.  6/21/2024  4:11 PM by Jimmy Khan DO. Jeni Melara was admitted from [facility]    Problem List  Patient Active Problem List    Diagnosis Date Noted    Age-related physical debility 06/21/2024     History reviewed. No pertinent past medical history.    Core Measures:  CVA: yes  CHF: no  PNA: no    Activity:  Level of Assistance: Moderate assist, patient does 50-74%  Number times ambulated in hallways past shift: 0  Number of times OOB to chair past shift: 0    Cardiac:   Cardiac Monitoring: yes,     Access:   Current line(s): PIV    Respiratory:   O2 Device: None (Room air)    GI:  Last BM (including prior to admit): 06/21/24  Current diet:  ADULT DIET; Dysphagia - Pureed  Tolerating current diet: Yes    Pain Management:   Patient states pain is manageable on current regimen: yes    Skin:  Carlitos Scale Score: 17  Interventions: turn q2 turning self  Pressure injury: no    Patient Safety:  Fall Score: Mejia Total Score: 85  Interventions: bed alarm  Self-release roll belt: No  Dexterity to release roll belt: N/A   (must document dexterity  here by stating Yes or No here, otherwise this is a restraint and must follow restraint documentation policy.)    DVT prophylaxis:  DVT prophylaxis: meds    Active Consults:  IP CONSULT TO CASE MANAGEMENT  IP CONSULT TO SOCIAL WORK  IP CONSULT TO VASCULAR SURGERY  IP CONSULT TO NEUROLOGY    Length of Stay:  Expected LOS:

## 2024-06-22 NOTE — CARE COORDINATION
Transition of Care Plan:    RUR: N/A, patient is in obs   Prior Level of Functioning: Independent in her ADLs. Her family assisted with some IADLs  Disposition: IPR-The patient's son would prefer that she d/c to [facility]. His second choice for IPR placement would be [facility]  If SNF or IPR: Date FOC offered: 6/22/24  Date FOC received: 6/22/24   Accepting facility: TBD-referral sent to [facility] and [facility]  Date authorization started with reference number: The patient will require auth  Date authorization received and expires:   Follow up appointments: PCP & Specialists as indicated  DME needed: TBD by IPR  Transportation at discharge: The patient will likely require stretcher transport on d/c  IM/IMM Medicare/ letter given: MACDONALD letter was given & signed by patient's son on 6/22/24  Is patient a [place] and connected with [facility]? N/A   If yes, was  transfer form completed and [facility] notified?   Caregiver Contact: Tim Melara, Son, Phone: [phone]  Discharge Caregiver contacted prior to discharge? CM spoke to the patient's son at bedside   Care Conference needed? No  Barriers to discharge: Pending acceptance by IPR, insurance auth     CM met with the patient's son at bedside to discuss dispo plan. The patient's son is agreeable to IPR placement and his preference for IPR facility would be the [facility]. His second choice for IPR facility would be [facility]. The patient will require insurance auth. CM has sent ref[facility]      The patient's son was provided explanation of MACDONALD letter. The patient's son signed MACDONALD letter today, 6/22/24.        06/22/24 5678   Service Assessment   Patient Orientation Unable to Assess   Cognition Alert   History Provided By Child/Family   Primary Caregiver Self   Support Systems Children;Family Members  (The patient is  and she lives alone at an  independent living facility. She has 1 son that lives locally and she has some grandchildren that live locally. Her other 2 children reside in [place])   PCP Verified by CM Yes  (The patient had to find a new PCP when [place] relocated [place]. She has a new PCP appt on 9/17/24)   Prior Functional Level Assistance with the following:;Housework;Cooking   Current Functional Level Assistance with the following:;Mobility;Bathing;Dressing;Toileting;Cooking;Housework;Shopping   Can patient return to prior living arrangement No   Family able to assist with home care needs: No   Would you like for me to discuss the discharge plan with any other family members/significant others, and if so, who? Yes  (Son, Phone: [phone])   Financial Resources None   Community Resources None   Social/Functional History   Lives With Alone   Type of Home Apartment  (The patient resides in independent living at [facility])   Active  No   Patient's  Info The patient's son asssits with transportation needs   Mode of Transportation Car   Discharge Planning   Patient expects to be discharged to: Acute rehab   Services At/After Discharge   Transition of Care Consult (CM Consult) Acute Rehab   [place] Resource Information Provided? No   Mode of Transport at Discharge BLS   Confirm Follow Up Transport Family

## 2024-06-22 NOTE — PROGRESS NOTES
Hospitalist Progress Note    NAME:   Jeni Melara   : 1928   MRN: [id number]     Date/Time: 2024 12:16 PM  Patient PCP: No primary care provider on file.    Estimated discharge date:   Barriers: MRI, neurology clearance, need IPR on discharge      Assessment / Plan:    Strokelike symptoms-dysarthria, right  , Right facial droop  Monitor neurology follow-up  Continue telemetry  Appreciate neurology recs  CT head unremarkable  MRI pending  If MRI positive for stroke will need vascular workup  Continue aspirin    -May DC Plavix, pending MRI and neuro recommendation  -PT OT eval-IPr  Speech eval puréed diet    -Currently slurred speech-?  Edentulous  -Decreased right arm motions due to pain  -X-ray shoulder ordered      Status post 4 and right knee pain, on admission   From [facility]  Generalized debility  Chest x-ray, UA unremarkable  CT head negative  Continue fall precautions  Orthostatics blood pressure  PT OT eval    Mild hyponatremia resolved    incidental CT abd/pel for distention findings of unknown chronicity as pt denies any recent imaging, moved to [place] recently from [place]:  -Mild wall thickening and inflammation of R colon sugg of mild or early colitis  -Thoracoabdominal aortic aneurysm, aorta up to 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 Hemorrhagic or proteinaceous cyst, neoplasm not excluded  -5mm RLL nodule    -Vascular consulted not a surgical candidate no need for follow-up given her age    Chronic Conditions:  HTN, insomnia, depression, recently diagnosed with lactose intolerance w/ diarrhea, now resolved s/p changing her diet.  -benacar NF sub losartan    Medical Decision Making:   I personally reviewed labs:  I personally reviewed imaging:  I personally reviewed EKG:  Toxic drug monitoring:   Discussed case with:         Code Status: DNR  DVT Prophylaxis: Lovenox  GI Prophylaxis:    Subjective:     Chief Complaint / Reason for  unclear  Decreased range of motion right arm-?  Pain   psych:   Good insight. Not anxious nor agitated  Skin:  No rashes.  No jaundice    Reviewed most current lab test results and cultures  YES  Reviewed most current radiology test results   YES  Review and summation of old records today    NO  Reviewed patient's current orders and MAR    YES  PMH/SH reviewed - no change compared to H&P    Procedures: see electronic medical records for all procedures/Xrays and details which were not copied into this note but were reviewed prior to creation of Plan.      LABS:  I reviewed today's most current labs and imaging studies.  Pertinent labs include:  Recent Labs     06/20/24  1440 06/21/24  1724 06/22/24  0414   WBC 8.1 8.5 7.7   HGB 11.8 12.0 10.9*   HCT 36.3 34.7* 32.4*    280 256     Recent Labs     06/20/24  1440 06/21/24  1724 06/22/24  0414    134* 137   K 4.4 4.4 4.0    102 107   CO2 29 27 24   GLUCOSE 121* 106* 109*   BUN 30* 22* 20   CREATININE 1.42* 1.13* 1.09*   CALCIUM 9.5 9.3 8.5   MG  --  2.0  --    BILITOT 0.4 0.5 0.7   AST 19 18 16   ALT 20 20 16       Signed: Norma Garcia MD

## 2024-06-22 NOTE — H&P
findings of unknown chronicity as pt denies any recent imaging, moved to [place] recently from [place]:  -Mild wall thickening and inflammation of R colon sugg of mild or early colitis  -Thoracoabdominal aortic aneurysm, aorta up to 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 Hemorrhagic or proteinaceous cyst, neoplasm not excluded  -5mm RLL nodule  -consider follow-up with pcp for repeat imaging RLL in next 12 months chest per Radiology guidelines and Rad RECs  -consider follow-up with nephrology for renal mass/cyst - defer to dayshift for timing of inpt vs outpt  -vascular consult in AM routine ordered- appreciate expertise- likely to need outpt follow-up, will defer to vascular further in Am as appears stable  -recently w/u in [facility] for diarrhea and abd pain, dx lactose intolerance, states diet changed and now diarrhea resolved, plan lactose free diet and soft diet as edentulous    Chronic Conditions:  HTN, insomnia, depression, recently diagnosed with lactose intolerance w/ diarrhea, now resolved s/p changing her diet.  -benacar NF sub losartan  **home medications reviewed with patient and family son/granddaughter at the bedside and resumed as appropriate aside from as otherwise mentioned in this note **      Medical Decision Making:   I personally reviewed labs: as below listed  I personally reviewed imaging: as below per radiology reading listed  I personally reviewed EKG: NSR w/LBBB w/o ischemia or ectopy - already reviewed by ER provider  Toxic drug monitoring: daily kidneys  Discussed case with: ED provider. After discussion I am in agreement that acuity of patient's medical condition necessitates hospital stay.  D/w Gerardo Drummond plan of care as outlined in this note    Code Status: DNR/DNI,  wants any and all other medically recommended treatments  SDM patient elects on admit: son/BARRERA Melara,   DVT Prophylaxis: low risk, not indicated at this time  GI Prophylaxis:  and reconcile the patient's current medications.    I attest the foregoing medication list in the medical record is true, accurate, and complete to the best of my knowledge. I have utilized all available resources available at the time of admit to complete medication reconciliation.

## 2024-06-22 NOTE — PROGRESS NOTES
Consulted for incidental finding of TAAA incidentally found on CT scanning  Does not require rectification  No need for follow up given her age and health as she would not be a candidate for repair.

## 2024-06-22 NOTE — PLAN OF CARE
support  Standing - Dynamic: Not tested  Ambulation/Gait Training:                       Gait  Gait Training: No                                                                                                                                                                                                                                                      Intervention/Education specific to: \"Stroke diagnoses\"    Patient was educated regarding her deficit(s) of R-sided weakness, impaired balance, R facial droop as this relates to her diagnosis of possible CVA.  She demonstrated good  understanding as evidenced by verbalizing understanding.    Patient and/or family was verbally and visually educated on the BE FAST acronym for signs/symptoms of CVA and TIA.  All questions answered with patient indicating good  understanding.     Martinez Balance Test:    Martinez Balance Scale  1. Sitting to Standing: Needs moderate or maximal assist to stand  2. Standing Unsupported: Unable to stand 30 seconds unsupported  3. Sitting with Back Unsupported but Feet Supported on Floor or on a Stool: Unable to sit without support 10 seconds  4. Standing to Sitting: Needs assist to sit  5.  Transfers: Needs two people to assist or supervise to be safe  6. Standing Unsupported with Eyes Closed: Needs help to keep from falling  7. Standing Unsupported with Feet Together: Needs help to attain position and unable to hold for 15 seconds  8. Reach Forward with Outstretched Arm While Standing: Loses balance while trying/requires external support  9.  Object from Floor from a Standing Position: Unable to try/needs assist to keep from losing balance or falling  10. Turning to Look Behind Over Left and Right Shoulders While Standing: Needs assist to keep from losing balance or falling  11. Turn 360 Degrees: Needs assistance while turning  12. Place Alternate Foot on Step or Stool While Standing Unsupported: Needs assistance to keep from  evaluation is determined to be of the following complexity level: Medium

## 2024-06-22 NOTE — ED NOTES
2150 06/21/24 - [place] NP made aware of persistent HTN while in the ED - NP instructed to monitor patient and to notify provider as needed over night for any changes - not indicated for any IV antihypertensive at this time, provider will order PO antihypertensive for the AM;; Please review flowsheets for vital sign trend;;

## 2024-06-22 NOTE — PLAN OF CARE
Problem: Occupational Therapy - Adult  Goal: By Discharge: Performs self-care activities at highest level of function for planned discharge setting.  See evaluation for individualized goals.  Description: FUNCTIONAL STATUS PRIOR TO ADMISSION:  Patient was independent in ADLs and functional mobility.    ,  ,  ,  ,  ,  ,  ,  ,  ,  ,       HOME SUPPORT: Patient lived alone on independent side of [facility].    Occupational Therapy Goals:  Initiated 6/22/2024  1.  Patient will perform grooming while incorporating RUE with Stand by Assist within 7 day(s).  2.  Patient will perform upper body dressing with Minimal Assist within 7 day(s).  3.  Patient will perform lower body dressing with Moderate Assist within 7 day(s).  4.  Patient will perform toilet transfers with Minimal Assist  within 7 day(s).  5.  Patient will perform all aspects of toileting with Minimal Assist within 7 day(s).  6.  Patient will participate in upper extremity therapeutic exercise/activities with Stand by Assist within 7 day(s).    7.  Patient will utilize energy conservation techniques during functional activities with no cues within 7 day(s).  8.  Patient will improve their Fugl Barton score by 5 points in prep for ADLs within 7 days.      Outcome: Progressing   OCCUPATIONAL THERAPY EVALUATION    Patient: Jeni Melara (89 y.o. female)  Date: 6/22/2024  Primary Diagnosis: Age-related physical debility [R54]         Precautions: Fall Risk, Bed Alarm                  ASSESSMENT :  The patient is limited by decreased functional mobility, independence in ADLs, high-level IADLs, ROM, strength, body mechanics, activity tolerance, endurance, safety awareness, attention/concentration, coordination, balance, vision/visual deficit, posture, fine-motor control.    Based on the impairments listed above patient is functioning significantly below her baseline for ADLs and functional mobility. She is now completing ADLs with stand-by to total assist and  assistance  Supine to Sit: Maximum assistance;Assist X2  Sit to Supine: Maximum assistance;Assist X2  Scooting: Maximum assistance    Transfers:      Transfer Training  Transfer Training: Yes  Overall Level of Assistance: Maximum assistance  Interventions: Verbal cues;Tactile cues;Safety awareness training  Sit to Stand: Moderate assistance  Stand to Sit: Maximum assistance                     Balance:      Balance  Sitting: Impaired  Sitting - Static: Poor (constant support)  Sitting - Dynamic: Poor (constant support)  Standing: Impaired  Standing - Static: Poor;Constant support  Standing - Dynamic: Not tested      ADL Assessment:     Feeding: Stand by assistance  Feeding Skilled Clinical Factors: using LUE    Grooming: Minimal assistance       UE Bathing: Maximum assistance       LE Bathing: Dependent/Total       UE Dressing: Maximum assistance       LE Dressing: Dependent/Total       Toileting: Dependent/Total      ADL Intervention and task modifications:    Intervention/Education specific to: \"Stroke diagnoses    Patient and/or family was verbally and visually educated on the BE FAST acronym for signs/symptoms of CVA and TIA.  All questions answered with patient indicating good  understanding.     Fugl-Barton Assessment of Motor Recovery after Stroke:     Reflex Activity  Flexors/Biceps/Fingers: Can be elicited  Extensors/Triceps: Can be elicited  Reflex Subtotal: 4    Volitional Movement Within Synergies  Shoulder Retraction: Partial  Shoulder Elevation: Partial  Shoulder Abduction (90 degrees): Partial  Shoulder External Rotation: Partial  Elbow Flexion: Partial  Forearm Supination: Partial  Shoulder Adduction/Internal Rotation: Partial  Elbow Extension: Partial  Forearm Pronation: Partial  Subtotal: 9    Volitional Movement Mixing Synergies  Hand to Lumbar Spine: Partial  Shoulder Flexion (0-90 degrees): Partial  Pronation-Supination: Partial  Subtotal: 3    Volitional Movement With Little or No

## 2024-06-22 NOTE — PROGRESS NOTES
0022: Received patient from ER with blood pressure of 188/78, perfectserve sent to Stem NP, prescribe losartan 50 mg PO given.    0400: Went inside room to check on patient, ask patient if she is okay. Patient was having difficulty speaking, slurring of speech, patient anxious. Able to move ext. With elevated /78. Called Rapid Response,Stem NP and team attended. NP assess patient as baseline on admission.    0520: Given one dose of PO clonidine recheck /59, patient still having slurred speech, NP notified to reevaluate patient. NP on emergency.  Nursing Supervisor come to unit to see patient and called Dr. Khan to reevaluate patient.  Called Stroke activated. Tele-neuro consult done.   TRANSFER - OUT REPORT:    Verbal report given to Martha HARRINGTON on Jeni Melara  being transferred to Neuro tele 141 for change in patient condition (stroke workout)       Report consisted of patient's Situation, Background, Assessment and   Recommendations(SBAR).     Information from the following report(s) Nurse Handoff Report, Intake/Output, MAR, Recent Results, and Cardiac Rhythm NSR  was reviewed with the receiving nurse.  [place] Assessment: Presents to emergency department  because of falls (Syncope, seizure, or loss of consciousness): No, Age > 70: Yes, Altered Mental Status, Intoxication with alcohol or substance confusion (Disorientation, impaired judgment, poor safety awaremess, or inability to follow instructions): Yes, Impaired Mobility: Ambulates or transfers with assistive devices or assistance; Unable to ambulate or transer.: Yes, Nursing Judgement: Yes  Lines:   Peripheral IV 06/21/24 Left;Ventral Forearm (Active)   Site Assessment Clean, dry & intact 06/22/24 0022   Line Status Flushed;Blood return noted;Infusing 06/22/24 0022   Line Care Connections checked and tightened;Ports disinfected 06/22/24 0022   Phlebitis Assessment No symptoms 06/22/24 0022   Infiltration Assessment 0 06/22/24 0022

## 2024-06-23 PROBLEM — I61.9 CVA (CEREBROVASCULAR ACCIDENT DUE TO INTRACEREBRAL HEMORRHAGE) (HCC): Status: ACTIVE | Noted: 2024-06-23

## 2024-06-23 LAB
ANION GAP SERPL CALC-SCNC: 5 MMOL/L (ref 5–15)
BASOPHILS # BLD: 0 K/UL (ref 0–0.1)
BASOPHILS NFR BLD: 1 % (ref 0–1)
BUN SERPL-MCNC: 23 MG/DL (ref 6–20)
BUN/CREAT SERPL: 21 (ref 12–20)
CALCIUM SERPL-MCNC: 8.4 MG/DL (ref 8.5–10.1)
CHLORIDE SERPL-SCNC: 107 MMOL/L (ref 97–108)
CHOLEST SERPL-MCNC: 160 MG/DL
CO2 SERPL-SCNC: 25 MMOL/L (ref 21–32)
CREAT SERPL-MCNC: 1.12 MG/DL (ref 0.55–1.02)
DIFFERENTIAL METHOD BLD: ABNORMAL
EKG ATRIAL RATE: 70 BPM
EKG DIAGNOSIS: NORMAL
EKG DIAGNOSIS: NORMAL
EKG P AXIS: 84 DEGREES
EKG P-R INTERVAL: 172 MS
EKG Q-T INTERVAL: 298 MS
EKG Q-T INTERVAL: 462 MS
EKG QRS DURATION: 122 MS
EKG QRS DURATION: 122 MS
EKG QTC CALCULATION (BAZETT): 447 MS
EKG QTC CALCULATION (BAZETT): 498 MS
EKG R AXIS: -10 DEGREES
EKG R AXIS: -9 DEGREES
EKG T AXIS: 103 DEGREES
EKG T AXIS: 176 DEGREES
EKG VENTRICULAR RATE: 135 BPM
EKG VENTRICULAR RATE: 70 BPM
EOSINOPHIL # BLD: 0.1 K/UL (ref 0–0.4)
EOSINOPHIL NFR BLD: 2 % (ref 0–7)
ERYTHROCYTE [DISTWIDTH] IN BLOOD BY AUTOMATED COUNT: 13.3 % (ref 11.5–14.5)
EST. AVERAGE GLUCOSE BLD GHB EST-MCNC: 108 MG/DL
GLUCOSE BLD STRIP.AUTO-MCNC: 101 MG/DL (ref 65–117)
GLUCOSE BLD STRIP.AUTO-MCNC: 102 MG/DL (ref 65–117)
GLUCOSE BLD STRIP.AUTO-MCNC: 111 MG/DL (ref 65–117)
GLUCOSE BLD STRIP.AUTO-MCNC: 97 MG/DL (ref 65–117)
GLUCOSE SERPL-MCNC: 95 MG/DL (ref 65–100)
HBA1C MFR BLD: 5.4 % (ref 4–5.6)
HCT VFR BLD AUTO: 29 % (ref 35–47)
HDLC SERPL-MCNC: 48 MG/DL
HDLC SERPL: 3.3 (ref 0–5)
HGB BLD-MCNC: 9.6 G/DL (ref 11.5–16)
IMM GRANULOCYTES # BLD AUTO: 0 K/UL (ref 0–0.04)
IMM GRANULOCYTES NFR BLD AUTO: 1 % (ref 0–0.5)
LDLC SERPL CALC-MCNC: 96 MG/DL (ref 0–100)
LYMPHOCYTES # BLD: 1.4 K/UL (ref 0.8–3.5)
LYMPHOCYTES NFR BLD: 24 % (ref 12–49)
Lab: NORMAL
MAGNESIUM SERPL-MCNC: 1.9 MG/DL (ref 1.6–2.4)
MCH RBC QN AUTO: 28.5 PG (ref 26–34)
MCHC RBC AUTO-ENTMCNC: 33.1 G/DL (ref 30–36.5)
MCV RBC AUTO: 86.1 FL (ref 80–99)
MONOCYTES # BLD: 0.5 K/UL (ref 0–1)
MONOCYTES NFR BLD: 9 % (ref 5–13)
NEUTS SEG # BLD: 3.8 K/UL (ref 1.8–8)
NEUTS SEG NFR BLD: 63 % (ref 32–75)
NRBC # BLD: 0 K/UL (ref 0–0.01)
NRBC BLD-RTO: 0 PER 100 WBC
PLATELET # BLD AUTO: 235 K/UL (ref 150–400)
PMV BLD AUTO: 9.6 FL (ref 8.9–12.9)
POTASSIUM SERPL-SCNC: 3.9 MMOL/L (ref 3.5–5.1)
RBC # BLD AUTO: 3.37 M/UL (ref 3.8–5.2)
SERVICE CMNT-IMP: NORMAL
SODIUM SERPL-SCNC: 137 MMOL/L (ref 136–145)
T4 FREE SERPL-MCNC: 1.12 NG/DL (ref 0.82–1.77)
THYROID PEROXIDASE ANTIBODY: 18 IU/ML (ref 0–34)
TRIGL SERPL-MCNC: 80 MG/DL
TSH SERPL DL<=0.05 MIU/L-ACNC: 5.46 UIU/ML (ref 0.45–4.5)
VLDLC SERPL CALC-MCNC: 16 MG/DL
WBC # BLD AUTO: 6 K/UL (ref 3.6–11)

## 2024-06-23 PROCEDURE — 2580000003 HC RX 258: Performed by: STUDENT IN AN ORGANIZED HEALTH CARE EDUCATION/TRAINING PROGRAM

## 2024-06-23 PROCEDURE — 83735 ASSAY OF MAGNESIUM: CPT

## 2024-06-23 PROCEDURE — 83036 HEMOGLOBIN GLYCOSYLATED A1C: CPT

## 2024-06-23 PROCEDURE — 80048 BASIC METABOLIC PNL TOTAL CA: CPT

## 2024-06-23 PROCEDURE — 6370000000 HC RX 637 (ALT 250 FOR IP): Performed by: STUDENT IN AN ORGANIZED HEALTH CARE EDUCATION/TRAINING PROGRAM

## 2024-06-23 PROCEDURE — 6360000002 HC RX W HCPCS: Performed by: NURSE PRACTITIONER

## 2024-06-23 PROCEDURE — 96374 THER/PROPH/DIAG INJ IV PUSH: CPT

## 2024-06-23 PROCEDURE — 82962 GLUCOSE BLOOD TEST: CPT

## 2024-06-23 PROCEDURE — 2500000003 HC RX 250 WO HCPCS: Performed by: NURSE PRACTITIONER

## 2024-06-23 PROCEDURE — 99233 SBSQ HOSP IP/OBS HIGH 50: CPT | Performed by: INTERNAL MEDICINE

## 2024-06-23 PROCEDURE — 36415 COLL VENOUS BLD VENIPUNCTURE: CPT

## 2024-06-23 PROCEDURE — G0378 HOSPITAL OBSERVATION PER HR: HCPCS

## 2024-06-23 PROCEDURE — 96361 HYDRATE IV INFUSION ADD-ON: CPT

## 2024-06-23 PROCEDURE — 80061 LIPID PANEL: CPT

## 2024-06-23 PROCEDURE — 2580000003 HC RX 258: Performed by: NURSE PRACTITIONER

## 2024-06-23 PROCEDURE — 85027 COMPLETE CBC AUTOMATED: CPT

## 2024-06-23 PROCEDURE — 1100000000 HC RM PRIVATE

## 2024-06-23 PROCEDURE — 96372 THER/PROPH/DIAG INJ SC/IM: CPT

## 2024-06-23 PROCEDURE — 6370000000 HC RX 637 (ALT 250 FOR IP): Performed by: NURSE PRACTITIONER

## 2024-06-23 PROCEDURE — 96375 TX/PRO/DX INJ NEW DRUG ADDON: CPT

## 2024-06-23 RX ORDER — DILTIAZEM HYDROCHLORIDE 100 MG/1
INJECTION, POWDER, LYOPHILIZED, FOR SOLUTION INTRAVENOUS
Status: DISPENSED
Start: 2024-06-23 | End: 2024-06-23

## 2024-06-23 RX ORDER — SODIUM CHLORIDE 9 MG/ML
INJECTION, SOLUTION INTRAVENOUS CONTINUOUS
Status: DISCONTINUED | OUTPATIENT
Start: 2024-06-23 | End: 2024-06-24

## 2024-06-23 RX ORDER — 0.9 % SODIUM CHLORIDE 0.9 %
250 INTRAVENOUS SOLUTION INTRAVENOUS ONCE
Status: COMPLETED | OUTPATIENT
Start: 2024-06-23 | End: 2024-06-23

## 2024-06-23 RX ORDER — DILTIAZEM HYDROCHLORIDE 5 MG/ML
10 INJECTION INTRAVENOUS ONCE
Status: COMPLETED | OUTPATIENT
Start: 2024-06-23 | End: 2024-06-23

## 2024-06-23 RX ORDER — POTASSIUM CHLORIDE 7.45 MG/ML
10 INJECTION INTRAVENOUS ONCE
Status: COMPLETED | OUTPATIENT
Start: 2024-06-23 | End: 2024-06-23

## 2024-06-23 RX ADMIN — TRAZODONE HYDROCHLORIDE 50 MG: 50 TABLET ORAL at 21:39

## 2024-06-23 RX ADMIN — DILTIAZEM HYDROCHLORIDE 10 MG: 5 INJECTION, SOLUTION INTRAVENOUS at 03:13

## 2024-06-23 RX ADMIN — ENOXAPARIN SODIUM 30 MG: 100 INJECTION SUBCUTANEOUS at 09:10

## 2024-06-23 RX ADMIN — ASPIRIN 81 MG: 81 TABLET, CHEWABLE ORAL at 09:11

## 2024-06-23 RX ADMIN — SODIUM CHLORIDE, PRESERVATIVE FREE 10 ML: 5 INJECTION INTRAVENOUS at 21:39

## 2024-06-23 RX ADMIN — SERTRALINE HYDROCHLORIDE 50 MG: 50 TABLET ORAL at 09:11

## 2024-06-23 RX ADMIN — SODIUM CHLORIDE: 9 INJECTION, SOLUTION INTRAVENOUS at 05:14

## 2024-06-23 RX ADMIN — SODIUM CHLORIDE 250 ML: 9 INJECTION, SOLUTION INTRAVENOUS at 03:58

## 2024-06-23 RX ADMIN — ROSUVASTATIN CALCIUM 40 MG: 40 TABLET, FILM COATED ORAL at 21:39

## 2024-06-23 RX ADMIN — ACETAMINOPHEN 650 MG: 325 TABLET ORAL at 21:39

## 2024-06-23 RX ADMIN — SODIUM CHLORIDE, PRESERVATIVE FREE 10 ML: 5 INJECTION INTRAVENOUS at 09:15

## 2024-06-23 RX ADMIN — POTASSIUM CHLORIDE 10 MEQ: 7.46 INJECTION, SOLUTION INTRAVENOUS at 05:12

## 2024-06-23 ASSESSMENT — PAIN SCALES - GENERAL: PAINLEVEL_OUTOF10: 0

## 2024-06-23 NOTE — PROGRESS NOTES
End of Shift Note    Bedside shift change report given to Calista salvadoru , RN (oncoming nurse) by Marcy Dai RN .        Shift worked:  Night    Shift summary and any significant changes:     Patient was running sinus thom  to afib and sinus tachy  on her monitor over the night though asymptomatic.  Her heart rate was in 50s.    CT scan deffered last night patient refused and requested to be done tomorrow.  At 0230 patient noted to have a new symptom of afib and sinus tachy. Reviewed by provider  ordered to be transferred to pcu for close monitoring.     Concerns for physician to address:     Zone phone for oncoming shift:        Patient Information  Jeni Melara  89 y.o.  6/21/2024  4:11 PM by Jimmy Khan DO. Jeni Melara was admitted from [facility]    Problem List  Patient Active Problem List    Diagnosis Date Noted    Age-related physical debility 06/21/2024     History reviewed. No pertinent past medical history.    Core Measures:  CVA: yes  CHF: no  PNA: no    Activity:  Level of Assistance: Moderate assist, patient does 50-74%  Number times ambulated in hallways past shift: 0  Number of times OOB to chair past shift: 0    Cardiac:   Cardiac Monitoring: yes,     Access:   Current line(s): PIV    Respiratory:   O2 Device: None (Room air)    GI:  Last BM (including prior to admit): 06/21/24  Current diet:  Diet NPO  Tolerating current diet: Yes    Pain Management:   Patient states pain is manageable on current regimen: yes    Skin:  Carlitos Scale Score: 17  Interventions: turn q2 turning self  Pressure injury: no    Patient Safety:  Fall Score: Mejia Total Score: 85  Interventions: bed alarm  Self-release roll belt: No  Dexterity to release roll belt: N/A   (must document dexterity  here by stating Yes or No here, otherwise this is a restraint and must follow restraint documentation policy.)    DVT prophylaxis:  DVT prophylaxis: meds    Active Consults:  IP CONSULT TO CASE MANAGEMENT  IP CONSULT TO SOCIAL  WORK  IP CONSULT TO VASCULAR SURGERY  IP CONSULT TO NEUROLOGY  IP CONSULT TO CARDIOLOGY    Length of Stay:  Expected LOS: 3  Actual LOS: 0    Marcy Dai RN

## 2024-06-23 NOTE — SIGNIFICANT EVENT
Nocturnist/cross cover provider called to room at approximately 0220 as RRT was called for tachycardia.     Patient Vitals for the past 8 hrs:   BP Temp Temp src Pulse Resp SpO2   06/23/24 0226 131/77 -- -- (!) 112 20 97 %   06/23/24 0000 (!) 148/60 97.6 °F (36.4 °C) Oral 52 16 95 %   06/22/24 2308 (!) 151/62 97.3 °F (36.3 °C) Oral (!) 48 14 95 %   06/22/24 2150 (!) 150/66 97.5 °F (36.4 °C) Oral 54 16 93 %   06/22/24 1950 (!) 198/79 98.6 °F (37 °C) Oral 57 16 92 %     No intake or output data in the 24 hours ending 06/23/24 0231      BACKGROUND/ SITUATION:  Attending provider following patient: Dr Garcia    89 y.o. female h/o  has no past medical history on file.   admitted 6/21/2024 for Age-related physical debility [R54].  See prior hospitalist group notes for complete details of course of treatment.      FINDINGS:  Nurse reported pt hr tachy 130s and also having bradycardia on telemetry monitoring intermittently per nurse. On bedside exam EKG as below. Pt asymptomatic, vss aside from tachy rate 110s to 130s. NAD noted on exam. Pt denies any other concerns or complaints on exam. At this time nurse also reported pt concern for difficulty to swallow, not new, but states concern when drinking water. Will place NPO, as appears already has SLP consult in s/p cva w/u ordered. Nurse also tells me pt had ct scan ordered last night, but declined to have due to states wanted to rest and would be agreeable with obtaining in the AM. No other concerns reported by nursing at this time, no other concerns reported by pt at this time.  -EKG upon my initial review revealed afib rvr w/o ischemia or ectopy- awaiting final cardiology reading.     Total 12 point ROS reviewed, and was negative aside from as mentioned above.    Physical Examination:   General appearance: alert, elderly, obese body habitus, NAD, edentulous.  Head: Atraumatic, normocephalic  Eyes: PERRL. EOMI bilaterally. No scleral icterus.    ENT: Oral mucosa is  moist and free of lesions.  No tracheal deviation.  No JVD.    Respiratory: Normal respiratory effort. No wheezes, crackles or stridor, no inc work of breathing, no inc o2 consumption, no c/o dyspnea  Cardiovascular: Irregular tachy rate and irregular rhythm with normal S1/S2. No murmers, rubs or gallops.   Abdomen: Normoactive bowel sounds.  Abdomen soft, non-tender, non-distended. No guarding or rebound tenderness  Musculoskeletal: JAIMES, aside not moving very much the already known RUE, RLE- not reported to be new or worse s/p 6/22 cva.  Skin: No acute injury noted  Neurologic: Alert and oriented x4. No focal motor or sensory deficits noted.      ASSESSMENT/ INTERVENTION/ RESPONSE  New onset afib rvr, rate sustaining 130s, bp stable, asymptomatic -stat EKG as above. Am labs already done- k 3.9- kcl 10meq iv x1, add on mag level. Cbc, bmp already done this am as ordered- remaining results pending. Diltiazem gtt and bolus ordered. Vss presently. Transfer to SDU for cardizem gtt. Cardiology consult for am ordered- appreciate expertise.  Dysphagia acute s/p 6/22 cva, nurse reported feels pt unable to swallow water safely, not reported to be new or worse, reported present since cva the prior morning, no acute focal deficits noted on exam per my d/w nurse- pt pending SLP consult already ordered per cva protocol, will make npo until cleared by slp, add cmivf low flow for now for supportive care while npo.  Will defer further evaluation/management to the day shift primary care team.    -Discussed with Gerardo Drummond plan of care as outlined in this note  -0508 I called to notify granddaughter/NOK- of plan as above, trs SDU, all questions answered to her satisfaction. In agreement with plan as above. Spent approx 18 mins d/w granddaughter on the phone, answering questions, explaining plan as above.  -Recent lab work and notes reviewed.    I have spent 60 minutes of critical care time involved in lab review,

## 2024-06-23 NOTE — PROGRESS NOTES
Hospitalist Progress Note    NAME:   Jeni Melara   : 1928   MRN: [id number]     Date/Time: 2024 11:59 AM  Patient PCP: No primary care provider on file.    Estimated discharge date:   Barriers: MRI, neurology clearance, need IPR on discharge      Assessment / Plan:    Acute CVA  dysarthria, Right facial droop  Continue telemetry  Appreciate neurology recs  CT head unremarkable  MRI Acute infarction involving corona radiata, extending inferiorly to the  posterior lentiform nucleus.  2. Moderate atrophy with abundant chronic small vessel ischemic disease in the  white matter.  Sbp  goal <140/90  Started on aspirin  CTA head and neck pending  LDL >96, started statin 40mg daily  A1c 5.4  -PT OT eval-IPr  Speech eval puréed diet  Echo pending      Pafib- new onset  Appreciate card consult  Episodes of bradycardia on tele  Plan EP consult tmrw am  Hold of NOAC  due to high risk for fall and pending neuro recs    Dc cardizem drip  HR 60s on monitor  Will hold off adding bb/cardizem scheduled for now  Can use iv low dose cardizem if HR > 120 and sustaining        Rt shoulder pain  Chronic per family  Xray shoulder pending      Status post 4 and right knee pain, on admission   From [facility]  Generalized debility  Chest x-ray, UA unremarkable  CT head negative  Continue fall precautions  Orthostatics blood pressure  PT OT eval-ipr    Mild hyponatremia resolved    incidental CT abd/pel for distention findings of unknown chronicity as pt denies any recent imaging, moved [place] recently [place]:  -Mild wall thickening and inflammation of R colon sugg of mild or early colitis  -Thoracoabdominal aortic aneurysm, aorta up to 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 Hemorrhagic or proteinaceous cyst, neoplasm not excluded  -5mm RLL nodule    -Vascular consulted not a surgical candidate no need for follow-up given her age    Chronic Conditions:  HTN, I  -losartan

## 2024-06-23 NOTE — CONSULTS
[facility]  [address] [place] [address]  [phone]  [email or web address]       CARDIOLOGY CONSULTATION       Date of  Admission: 6/21/2024  4:11 PM     Admission type:Emergency   Primary Care Physician:No primary care provider on file.     Attending Provider: Norma Garcia MD  Cardiology Provider:     PLEASE NOTE THAT WE CONFIRMED WITH THE REFERRING PHYSICIAN PRIOR TO SEEING THE PATIENT THAT THE PATIENT IS BEING REFERRED FOR INITIAL HOSPITAL EVALUATION AND FOR LONG-TERM ONGOING CARDIAC CARE    CC/REASON FOR CONSULT: atrial fibrillaation     Subjective:     Jeni Melara is a 89 y.o. female admitted for Age-related physical debility [R54]  CVA (cerebrovascular accident due to intracerebral hemorrhage) ([facility]) [I61.9].  Patient is a 89-year-old female with a past medical history of hypertension, insomnia, depression, abdominal aortic aneurysm and additional past medical history as reported below has been admitted for ground-level fall.  During admission had concerns for CVA.  CT brain showing acute infarction involving the corona radiata extending inferiorly into the posterior lentiform nucleus.  Patient Active Problem List    Diagnosis Date Noted    CVA (cerebrovascular accident due to intracerebral hemorrhage) (HCC) 06/23/2024    Age-related physical debility 06/21/2024      No primary care provider on file.  History reviewed. No pertinent past medical history.   Social History     Socioeconomic History    Marital status:      Spouse name: None    Number of children: None    Years of education: None    Highest education level: None     Social Determinants of Health     Food Insecurity: No Food Insecurity (6/22/2024)    Hunger Vital Sign     Worried About Running Out of Food in the Last Year: Never true     Ran Out of Food in the Last Year: Never true   Transportation Needs: No Transportation Needs (6/22/2024)    PRAPARE - Transportation     Lack of  fibrillation can reintroduce low dose IV cardizem.     We will continue to follow.    Charles Colon DNP,ANP-BC    CC:No primary care provider on file.

## 2024-06-23 NOTE — PROGRESS NOTES
consultation.  Neurology will continue to follow.    I spent 50 minutes providing care to this acutely ill inpatient with > 50% of the time counseling as well as reviewing the patient's chart, notes, labs, medications and preparing documentation along with assisting in the coordination of care of the patient on the patient's hospital floor/unit.       Kimberley Foreman DO

## 2024-06-23 NOTE — PROGRESS NOTES
Pt refused CT on 6/22. Have called floor multiple times 6/23 with no answer from RN, pt must have 20G AC line placed for CTA that is ordered, please call CT [phone] when line is placed and pt agrees to scan, thanks

## 2024-06-23 NOTE — PROGRESS NOTES
End of Shift Note    Bedside shift change report given to  , RN (oncoming nurse) by Marcy Dai RN .        Shift worked:  Night    Shift summary and any significant changes:     Patient was running sinus thom  to afib and sinus tachy  on her monitor over the night though asymptomatic.  Her heart rate was in 50s.    CT scan deffered last night patient refused and requested to be done tomorrow.  At 0230 patient noted to have a new symptom of afib and sinus tachy. Reviewed by provider  ordered to be transferred to pcu for close monitoring.     Concerns for physician to address:     Zone phone for oncoming shift:        Patient Information  Jeni Melara  89 y.o.  6/21/2024  4:11 PM by Jimmy Khan DO. Jeni Melara was admitted from [facility]    Problem List  Patient Active Problem List    Diagnosis Date Noted    Age-related physical debility 06/21/2024     History reviewed. No pertinent past medical history.    Core Measures:  CVA: yes  CHF: no  PNA: no    Activity:  Level of Assistance: Moderate assist, patient does 50-74%  Number times ambulated in hallways past shift: 0  Number of times OOB to chair past shift: 0    Cardiac:   Cardiac Monitoring: yes,     Access:   Current line(s): PIV    Respiratory:   O2 Device: None (Room air)    GI:  Last BM (including prior to admit): 06/21/24  Current diet:  Diet NPO  Tolerating current diet: Yes    Pain Management:   Patient states pain is manageable on current regimen: yes    Skin:  Carlitos Scale Score: 17  Interventions: turn q2 turning self  Pressure injury: no    Patient Safety:  Fall Score: Mejia Total Score: 85  Interventions: bed alarm  Self-release roll belt: No  Dexterity to release roll belt: N/A   (must document dexterity  here by stating Yes or No here, otherwise this is a restraint and must follow restraint documentation policy.)    DVT prophylaxis:  DVT prophylaxis: meds    Active Consults:  IP CONSULT TO CASE MANAGEMENT  IP CONSULT TO SOCIAL WORK  IP  CONSULT TO VASCULAR SURGERY  IP CONSULT TO NEUROLOGY    Length of Stay:  Expected LOS: 3  Actual LOS: 0    Marcy Dai RN

## 2024-06-23 NOTE — PROGRESS NOTES
End of Shift Note    Bedside shift change report given to  LEN Quispe (oncoming nurse) by Marcy Dai RN .        Shift worked:  Night    Shift summary and any significant changes:     Patient was running sinus thom  to afib and sinus tachy  on her monitor over the night though asymptomatic.  Her heart rate was in 50s.    CT scan deffered last night patient refused and requested to be done tomorrow during the day.   Mri was +ve for stroke.  Awaits review by neurologist.   Scheduled meds and labs done.     Concerns for physician to address:     Zone phone for oncoming shift:        Patient Information  Jeni Melara  89 y.o.  6/21/2024  4:11 PM by Jimmy Khan DO. Jeni Melara was admitted from [facility]    Problem List  Patient Active Problem List    Diagnosis Date Noted    Age-related physical debility 06/21/2024     History reviewed. No pertinent past medical history.    Core Measures:  CVA: yes  CHF: no  PNA: no    Activity:  Level of Assistance: Moderate assist, patient does 50-74%  Number times ambulated in hallways past shift: 0  Number of times OOB to chair past shift: 0    Cardiac:   Cardiac Monitoring: yes,     Access:   Current line(s): PIV    Respiratory:   O2 Device: None (Room air)    GI:  Last BM (including prior to admit): 06/21/24  Current diet:  ADULT DIET; Dysphagia - Pureed  Tolerating current diet: Yes    Pain Management:   Patient states pain is manageable on current regimen: yes    Skin:  Carlitos Scale Score: 17  Interventions: turn q2 turning self  Pressure injury: no    Patient Safety:  Fall Score: Mejia Total Score: 85  Interventions: bed alarm  Self-release roll belt: No  Dexterity to release roll belt: N/A   (must document dexterity  here by stating Yes or No here, otherwise this is a restraint and must follow restraint documentation policy.)    DVT prophylaxis:  DVT prophylaxis: meds    Active Consults:  IP CONSULT TO CASE MANAGEMENT  IP CONSULT TO SOCIAL WORK  IP CONSULT TO VASCULAR  SURGERY  IP CONSULT TO NEUROLOGY    Length of Stay:  Expected LOS: 3  Actual LOS: 0    Marcy Dai RN

## 2024-06-23 NOTE — PLAN OF CARE
Problem: Discharge Planning  Goal: Discharge to home or other facility with appropriate resources  Outcome: Progressing     Problem: Skin/Tissue Integrity  Goal: Absence of new skin breakdown  Description: 1.  Monitor for areas of redness and/or skin breakdown  2.  Assess vascular access sites hourly  3.  Every 4-6 hours minimum:  Change oxygen saturation probe site  4.  Every 4-6 hours:  If on nasal continuous positive airway pressure, respiratory therapy assess nares and determine need for appliance change or resting period.  Outcome: Progressing     Problem: Safety - Adult  Goal: Free from fall injury  Outcome: Progressing     Problem: ABCDS Injury Assessment  Goal: Absence of physical injury  Outcome: Progressing     Problem: Physical Therapy - Adult  Goal: By Discharge: Performs mobility at highest level of function for planned discharge setting.  See evaluation for individualized goals.  Description: FUNCTIONAL STATUS PRIOR TO ADMISSION: Patient was independent and active without use of DME. The patient  was independent for basic and instrumental ADLs.    HOME SUPPORT PRIOR TO ADMISSION: Patient was living alone at independent living at the [place]. Family in the area.    Physical Therapy Goals  Initiated 6/22/2024  1.  Patient will move from supine to sit and sit to supine, scoot up and down, and roll side to side in bed with minimal assistance within 7 day(s).    2.  Patient will perform sit to stand with minimal assistance within 7 day(s).  3.  Patient will transfer from bed to chair and chair to bed with minimal assistance using the least restrictive device within 7 day(s).  4.  Patient will ambulate with moderate assistance for 25 feet with the least restrictive device within 7 day(s).   5.  Patient will improve Martinez Balance score by 7 points within 7 days.         6/22/2024 1129 by Debbie Polk, PT  Outcome: Progressing     Problem: Occupational Therapy - Adult  Goal: By Discharge: Performs

## 2024-06-24 ENCOUNTER — APPOINTMENT (OUTPATIENT)
Facility: HOSPITAL | Age: 89
DRG: 065 | End: 2024-06-24
Payer: MEDICARE

## 2024-06-24 ENCOUNTER — APPOINTMENT (OUTPATIENT)
Facility: HOSPITAL | Age: 89
DRG: 065 | End: 2024-06-24
Attending: INTERNAL MEDICINE
Payer: MEDICARE

## 2024-06-24 PROBLEM — I49.5 SSS (SICK SINUS SYNDROME) (HCC): Status: ACTIVE | Noted: 2024-06-24

## 2024-06-24 LAB
ANION GAP SERPL CALC-SCNC: 8 MMOL/L (ref 5–15)
BASOPHILS # BLD: 0 K/UL (ref 0–0.1)
BASOPHILS NFR BLD: 1 % (ref 0–1)
BUN SERPL-MCNC: 23 MG/DL (ref 6–20)
BUN/CREAT SERPL: 21 (ref 12–20)
CALCIUM SERPL-MCNC: 9 MG/DL (ref 8.5–10.1)
CHLORIDE SERPL-SCNC: 108 MMOL/L (ref 97–108)
CO2 SERPL-SCNC: 21 MMOL/L (ref 21–32)
CREAT SERPL-MCNC: 1.11 MG/DL (ref 0.55–1.02)
DIFFERENTIAL METHOD BLD: ABNORMAL
ECHO AO ASC DIAM: 3.7 CM
ECHO AO ASCENDING AORTA INDEX: 2.53 CM/M2
ECHO AO ROOT DIAM: 3.6 CM
ECHO AO ROOT INDEX: 2.47 CM/M2
ECHO AR MAX VEL PISA: 4.9 M/S
ECHO AV PEAK GRADIENT: 14 MMHG
ECHO AV PEAK VELOCITY: 1.9 M/S
ECHO AV REGURGITANT PHT: 524.6 MILLISECOND
ECHO BSA: 1.52 M2
ECHO EST RA PRESSURE: 15 MMHG
ECHO LA DIAMETER INDEX: 2.53 CM/M2
ECHO LA DIAMETER: 3.7 CM
ECHO LA TO AORTIC ROOT RATIO: 1.03
ECHO LV E' LATERAL VELOCITY: 5 CM/S
ECHO LV E' SEPTAL VELOCITY: 4 CM/S
ECHO LV EDV A4C: 66 ML
ECHO LV EDV INDEX A4C: 45 ML/M2
ECHO LV EJECTION FRACTION A4C: 60 %
ECHO LV ESV A4C: 27 ML
ECHO LV ESV INDEX A4C: 18 ML/M2
ECHO LV FRACTIONAL SHORTENING: 29 % (ref 28–44)
ECHO LV INTERNAL DIMENSION DIASTOLE INDEX: 2.4 CM/M2
ECHO LV INTERNAL DIMENSION DIASTOLIC: 3.5 CM (ref 3.9–5.3)
ECHO LV INTERNAL DIMENSION SYSTOLIC INDEX: 1.71 CM/M2
ECHO LV INTERNAL DIMENSION SYSTOLIC: 2.5 CM
ECHO LV IVSD: 1.3 CM (ref 0.6–0.9)
ECHO LV MASS 2D: 153.8 G (ref 67–162)
ECHO LV MASS INDEX 2D: 105.3 G/M2 (ref 43–95)
ECHO LV POSTERIOR WALL DIASTOLIC: 1.3 CM (ref 0.6–0.9)
ECHO LV RELATIVE WALL THICKNESS RATIO: 0.74
ECHO LVOT AREA: 3.5 CM2
ECHO LVOT DIAM: 2.1 CM
ECHO MV A VELOCITY: 0.8 M/S
ECHO MV E DECELERATION TIME (DT): 286.8 MS
ECHO MV E VELOCITY: 0.52 M/S
ECHO MV E/A RATIO: 0.65
ECHO MV E/E' LATERAL: 10.4
ECHO MV E/E' RATIO (AVERAGED): 11.7
ECHO MV E/E' SEPTAL: 13
ECHO MV REGURGITANT PEAK GRADIENT: 159 MMHG
ECHO MV REGURGITANT PEAK VELOCITY: 6.3 M/S
ECHO PV MAX VELOCITY: 1.1 M/S
ECHO PV PEAK GRADIENT: 5 MMHG
ECHO RIGHT VENTRICULAR SYSTOLIC PRESSURE (RVSP): 47 MMHG
ECHO TV REGURGITANT MAX VELOCITY: 2.83 M/S
ECHO TV REGURGITANT PEAK GRADIENT: 32 MMHG
EOSINOPHIL # BLD: 0.1 K/UL (ref 0–0.4)
EOSINOPHIL NFR BLD: 2 % (ref 0–7)
ERYTHROCYTE [DISTWIDTH] IN BLOOD BY AUTOMATED COUNT: 13.3 % (ref 11.5–14.5)
GLUCOSE BLD STRIP.AUTO-MCNC: 113 MG/DL (ref 65–117)
GLUCOSE BLD STRIP.AUTO-MCNC: 115 MG/DL (ref 65–117)
GLUCOSE BLD STRIP.AUTO-MCNC: 119 MG/DL (ref 65–117)
GLUCOSE BLD STRIP.AUTO-MCNC: 99 MG/DL (ref 65–117)
GLUCOSE SERPL-MCNC: 103 MG/DL (ref 65–100)
HCT VFR BLD AUTO: 31.3 % (ref 35–47)
HGB BLD-MCNC: 10.3 G/DL (ref 11.5–16)
IMM GRANULOCYTES # BLD AUTO: 0 K/UL (ref 0–0.04)
IMM GRANULOCYTES NFR BLD AUTO: 1 % (ref 0–0.5)
LYMPHOCYTES # BLD: 1.2 K/UL (ref 0.8–3.5)
LYMPHOCYTES NFR BLD: 19 % (ref 12–49)
MCH RBC QN AUTO: 28.1 PG (ref 26–34)
MCHC RBC AUTO-ENTMCNC: 32.9 G/DL (ref 30–36.5)
MCV RBC AUTO: 85.3 FL (ref 80–99)
MONOCYTES # BLD: 0.6 K/UL (ref 0–1)
MONOCYTES NFR BLD: 9 % (ref 5–13)
NEUTS SEG # BLD: 4.4 K/UL (ref 1.8–8)
NEUTS SEG NFR BLD: 68 % (ref 32–75)
NRBC # BLD: 0 K/UL (ref 0–0.01)
NRBC BLD-RTO: 0 PER 100 WBC
PLATELET # BLD AUTO: 278 K/UL (ref 150–400)
PMV BLD AUTO: 9.7 FL (ref 8.9–12.9)
POTASSIUM SERPL-SCNC: 3.9 MMOL/L (ref 3.5–5.1)
RBC # BLD AUTO: 3.67 M/UL (ref 3.8–5.2)
SERVICE CMNT-IMP: ABNORMAL
SERVICE CMNT-IMP: NORMAL
SODIUM SERPL-SCNC: 137 MMOL/L (ref 136–145)
WBC # BLD AUTO: 6.4 K/UL (ref 3.6–11)

## 2024-06-24 PROCEDURE — 80048 BASIC METABOLIC PNL TOTAL CA: CPT

## 2024-06-24 PROCEDURE — 97535 SELF CARE MNGMENT TRAINING: CPT

## 2024-06-24 PROCEDURE — 6370000000 HC RX 637 (ALT 250 FOR IP): Performed by: STUDENT IN AN ORGANIZED HEALTH CARE EDUCATION/TRAINING PROGRAM

## 2024-06-24 PROCEDURE — 85025 COMPLETE CBC W/AUTO DIFF WBC: CPT

## 2024-06-24 PROCEDURE — 6370000000 HC RX 637 (ALT 250 FOR IP): Performed by: NURSE PRACTITIONER

## 2024-06-24 PROCEDURE — 6360000004 HC RX CONTRAST MEDICATION: Performed by: STUDENT IN AN ORGANIZED HEALTH CARE EDUCATION/TRAINING PROGRAM

## 2024-06-24 PROCEDURE — 97112 NEUROMUSCULAR REEDUCATION: CPT

## 2024-06-24 PROCEDURE — 92526 ORAL FUNCTION THERAPY: CPT

## 2024-06-24 PROCEDURE — 92523 SPEECH SOUND LANG COMPREHEN: CPT

## 2024-06-24 PROCEDURE — 1100000000 HC RM PRIVATE

## 2024-06-24 PROCEDURE — 6360000002 HC RX W HCPCS: Performed by: INTERNAL MEDICINE

## 2024-06-24 PROCEDURE — 99233 SBSQ HOSP IP/OBS HIGH 50: CPT

## 2024-06-24 PROCEDURE — 93306 TTE W/DOPPLER COMPLETE: CPT

## 2024-06-24 PROCEDURE — 6360000002 HC RX W HCPCS: Performed by: NURSE PRACTITIONER

## 2024-06-24 PROCEDURE — 82962 GLUCOSE BLOOD TEST: CPT

## 2024-06-24 PROCEDURE — 93308 TTE F-UP OR LMTD: CPT

## 2024-06-24 PROCEDURE — 70498 CT ANGIOGRAPHY NECK: CPT

## 2024-06-24 PROCEDURE — 2580000003 HC RX 258: Performed by: STUDENT IN AN ORGANIZED HEALTH CARE EDUCATION/TRAINING PROGRAM

## 2024-06-24 PROCEDURE — 97530 THERAPEUTIC ACTIVITIES: CPT

## 2024-06-24 PROCEDURE — 36415 COLL VENOUS BLD VENIPUNCTURE: CPT

## 2024-06-24 PROCEDURE — 97110 THERAPEUTIC EXERCISES: CPT

## 2024-06-24 RX ORDER — HYDRALAZINE HYDROCHLORIDE 20 MG/ML
10 INJECTION INTRAMUSCULAR; INTRAVENOUS EVERY 6 HOURS PRN
Status: DISCONTINUED | OUTPATIENT
Start: 2024-06-24 | End: 2024-06-26 | Stop reason: HOSPADM

## 2024-06-24 RX ADMIN — ACETAMINOPHEN 650 MG: 325 TABLET ORAL at 12:13

## 2024-06-24 RX ADMIN — ASPIRIN 81 MG: 81 TABLET, CHEWABLE ORAL at 08:59

## 2024-06-24 RX ADMIN — ENOXAPARIN SODIUM 30 MG: 100 INJECTION SUBCUTANEOUS at 08:58

## 2024-06-24 RX ADMIN — LOSARTAN POTASSIUM 50 MG: 50 TABLET, FILM COATED ORAL at 12:18

## 2024-06-24 RX ADMIN — ROSUVASTATIN CALCIUM 40 MG: 40 TABLET, FILM COATED ORAL at 21:05

## 2024-06-24 RX ADMIN — LOSARTAN POTASSIUM 50 MG: 50 TABLET, FILM COATED ORAL at 12:17

## 2024-06-24 RX ADMIN — SERTRALINE HYDROCHLORIDE 50 MG: 50 TABLET ORAL at 08:59

## 2024-06-24 RX ADMIN — TRAZODONE HYDROCHLORIDE 50 MG: 50 TABLET ORAL at 21:05

## 2024-06-24 RX ADMIN — SODIUM CHLORIDE, PRESERVATIVE FREE 10 ML: 5 INJECTION INTRAVENOUS at 21:05

## 2024-06-24 RX ADMIN — HYDRALAZINE HYDROCHLORIDE 10 MG: 20 INJECTION, SOLUTION INTRAMUSCULAR; INTRAVENOUS at 12:12

## 2024-06-24 RX ADMIN — IOPAMIDOL 100 ML: 755 INJECTION, SOLUTION INTRAVENOUS at 14:14

## 2024-06-24 RX ADMIN — ACETAMINOPHEN 650 MG: 325 TABLET ORAL at 21:06

## 2024-06-24 RX ADMIN — SODIUM CHLORIDE, PRESERVATIVE FREE 10 ML: 5 INJECTION INTRAVENOUS at 08:59

## 2024-06-24 RX ADMIN — ACETAMINOPHEN 650 MG: 325 TABLET ORAL at 06:24

## 2024-06-24 ASSESSMENT — PAIN SCALES - GENERAL
PAINLEVEL_OUTOF10: 0
PAINLEVEL_OUTOF10: 0
PAINLEVEL_OUTOF10: 3

## 2024-06-24 ASSESSMENT — PAIN DESCRIPTION - ORIENTATION: ORIENTATION: RIGHT

## 2024-06-24 ASSESSMENT — PAIN DESCRIPTION - LOCATION: LOCATION: BACK

## 2024-06-24 ASSESSMENT — PAIN - FUNCTIONAL ASSESSMENT: PAIN_FUNCTIONAL_ASSESSMENT: ACTIVITIES ARE NOT PREVENTED

## 2024-06-24 NOTE — PROGRESS NOTES
HOSPITAL NEUROLOGY NOTE     Chief Complaint   Patient presents with    Fatigue        HPI  History excerpted from Dr. Foreman's note on 6/22/2024  Jeni Melara is a 89 y.o. female  who presents from nursing facility after having ground-level fall due to sudden onset bilateral lower extremity weakness.  Patient had stroke code called overnight for slurred speech however is edentulous and therefore was canceled.  Then she had another code stroke called overnight for right facial droop as well as not moving her right arm.  It is noted that her right arm has significant pain and this is limiting her motion.  Head CT overnight did not show any acute findings.     It is difficult to understand her given her slurred speech.  She states that she never wears dentures.  Apparently according to the family, they feel that some of her symptoms are behavioral and chronic in nature and happen intermittently.     She does mention that she has had chronic right shoulder pain.     She has no other acute neurological symptoms.       INTERIM DATA: At the time of my evaluation this morning, patient was awake, alert, and oriented.  She followed commands in all extremities with the exception of pain/discomfort in the right arm.  She denied any new weakness or numbness in any extremities, visual disturbances.  Speech has improved  and had slight difficulty with swallowing, mostly liquid.      ROS  A ten system review of constitutional, cardiovascular, respiratory, musculoskeletal, endocrine, skin, SHEENT, genitourinary, psychiatric and neurologic systems was obtained and is unremarkable except as stated in HPI     PMH  History reviewed. No pertinent past medical history.    ALLERGIES  No Known Allergies    PHYSICAL EXAMINATION:     General:   General appearance: Pt is in no acute distress   Distal pulses are preserved  Fundoscopic exam: attempted    Neurological Examination:   Mental Status: AAO x self, place, month, year, president.  Was  assisting in the coordination of care of the patient on the patient's hospital floor/unit.     ARISTEO Schumacher - GENARO     Collaborating physician:   Dr. Kimberley Foreman

## 2024-06-24 NOTE — PLAN OF CARE
with neurological impairments consistent with acute infarct left corona radiata, with right sided weakness, balance and coordination deficits, aphasia and dysarthria, and impaired functional mobility placing her well below indep baseline.  Patient is appropriate for inpatient rehab placement, she requires all three disciplines, will benefit from the intensity and can tolerate three hours of therapy.           PLAN:  Patient continues to benefit from skilled intervention to address the above impairments.  Continue treatment per established plan of care.    Recommend with staff: therapy recommendations for staff: Use Irena Steady for transfer to chair    Recommend for next PT session: sit to stand transfers, bed to bedside chair transfers, lateral transfers to a chair, and scooting towards head of bed    Recommendation for discharge: (in order for the patient to meet his/her long term goals): Therapy 3 hours/day 5-7 days/week    Other factors to consider for discharge: lives alone, patient's current support system is unable to meet their requirements for physical assistance, high risk for falls, not safe to be alone, and concern for safely navigating or managing the home environment    IF patient discharges home will need the following DME: continuing to assess with progress       SUBJECTIVE:   Patient stated, \"I want to get in that chair.\"    OBJECTIVE DATA SUMMARY:   Critical Behavior:  Orientation  Overall Orientation Status: Within Functional Limits  Orientation Level: Oriented to situation;Appropriate for developmental age  Cognition  Overall Cognitive Status: Exceptions  Arousal/Alertness: Appropriate responses to stimuli  Following Commands: Follows multistep commands with increased time;Follows one step commands with increased time  Attention Span: Attends with cues to redirect  Memory: Decreased short term memory  Safety Judgement: Decreased awareness of need for assistance  Insights: Fully aware of  deficits    Functional Mobility Training:  Bed Mobility:  Bed Mobility Training  Supine to Sit: Minimum assistance  Scooting: Moderate assistance;Minimum assistance  Transfers:  Transfer Training  Interventions: Verbal cues;Safety awareness training  Sit to Stand: Minimum assistance;Assist X2  Stand to Sit: Minimum assistance;Assist X2  Bed to Chair: Adaptive equipment (Irena Steady to chair)  Balance:  Balance  Sitting: Impaired  Sitting - Static: Fair (occasional)  Sitting - Dynamic: Poor (constant support)  Standing: Impaired  Standing - Static: Constant support;Fair  Standing - Dynamic: Constant support;Poor   Ambulation/Gait Training:              Neuro Re-Education:                      Activity Tolerance:   Good    After treatment:   Patient left in no apparent distress sitting up in chair, Call bell within reach, Bed/ chair alarm activated, and Caregiver / family present      COMMUNICATION/EDUCATION:   The patient's plan of care was discussed with: occupational therapist and registered nurse    Patient Education  Education Given To: Patient  Education Provided: Role of Therapy;Plan of Care;Fall Prevention Strategies;Home Exercise Program;Transfer Training  Education Method: Verbal  Barriers to Learning: Cognition  Education Outcome: Verbalized understanding;Demonstrated understanding      Natalie Bates PT  Minutes: 37

## 2024-06-24 NOTE — PLAN OF CARE
Problem: Occupational Therapy - Adult  Goal: By Discharge: Performs self-care activities at highest level of function for planned discharge setting.  See evaluation for individualized goals.  Description: FUNCTIONAL STATUS PRIOR TO ADMISSION:  Patient was independent in ADLs and functional mobility.    ,  ,  ,  ,  ,  ,  ,  ,  ,  ,       HOME SUPPORT: Patient lived alone on independent side of [facility].    Occupational Therapy Goals:  Initiated 6/22/2024  1.  Patient will perform grooming while incorporating RUE with Stand by Assist within 7 day(s).  2.  Patient will perform upper body dressing with Minimal Assist within 7 day(s).  3.  Patient will perform lower body dressing with Moderate Assist within 7 day(s).  4.  Patient will perform toilet transfers with Minimal Assist  within 7 day(s).  5.  Patient will perform all aspects of toileting with Minimal Assist within 7 day(s).  6.  Patient will participate in upper extremity therapeutic exercise/activities with Stand by Assist within 7 day(s).    7.  Patient will utilize energy conservation techniques during functional activities with no cues within 7 day(s).  8.  Patient will improve their Fugl Barton score by 5 points in prep for ADLs within 7 days.      Outcome: Progressing     OCCUPATIONAL THERAPY TREATMENT  Patient: Jeni Melara (89 y.o. female)  Date: 6/24/2024  Primary Diagnosis: Age-related physical debility [R54]  CVA (cerebrovascular accident due to intracerebral hemorrhage) ([facility]) [I61.9]       Precautions: Fall Risk, Bed Alarm                Chart, occupational therapy assessment, plan of care, and goals were reviewed.    ASSESSMENT  Patient continues to benefit from skilled OT services and is slowly progressing towards goals but remains limited by R sided deficits (strength, ROM, coordination), balance, and activity tolerance. Patient progressed OOB to chair this date, although required use of stephanie steady due to difficulty weight shifting/RLE

## 2024-06-24 NOTE — CARE COORDINATION
Transition of Care Plan:    RUR: 14% (low RUR)   Prior Level of Functioning: Independent in her ADLs. Her family assisted with some IADLs.  Disposition: IPR - referrals pending + auth needed, then return to [facility]  If SNF or IPR: Date FOC offered: 6/22/24  Date FOC received: 6/22/24 - [facility] and [facility]  Accepting facility: [facility]  Date authorization started with reference number: TBD  Date authorization received and expires: TBD  Follow up appointments: defer to IPR.  DME needed: defer to IPR.  Transportation at discharge: Stretcher anticipated  IM/IMM Medicare/ letter given: IM done 6/24.  Is patient a  and connected with VA? No   If yes, was [facility] transfer form completed and [facility] notified? N/A  Caregiver Contact: Tim Arias - [phone] - [email or web address]  Discharge Caregiver contacted prior to discharge? CM to contact, if needed.  Care Conference needed? No  Barriers to discharge: palliative to see, vascular/neuro/cards clearance, Echo, IPR placement + insurance auth     1329 - Aroldo at [facility] inquired about starting auth.  CM to speak with patient/family and get back to Aroldo.    1335 to 1347 - MARGARITA attempted to speak with patient at bedside; she was currently speaking with physician.  CM contacted son, Tim, who is agreeable to which ever facility has a bed available sooner.  CM attempted to contact [facility], unable to reach anyone.  MARGARITA returned call to Aroldo who will contact family for more information.  She is aware that [facility] can start auth.    1522 - [facility] pending, CM unable to reach anyone at [facility] and message sent to them via [employer].    1535 - CM sent SNF list to Tim via email.  CM attempted to contact Tim via phone call; unable to reach anyone and VM left regarding SNF list sent in the event that SNF choices are needed.        Cindy Perez, GLADISN, RN    Care Management  [phone]

## 2024-06-24 NOTE — CONSULTS
Vascular Surgery Consult Note  6/24/2024    Subjective:     Jeni Melara is a 89 y.o. female with a pmhx significant for hypertension and anxiety.       She is admitted to the hospital with an acute stroke after a GLF at her assisted living facility.  Since admission she was evaluate for stroke with slurred speech, dysarthria, right facial droop, and right sided weakness. During admission she had a CTA of the chest and a CT of the abd/plevies that were diagnostic of a 5.3 cm TAAA and we were asked to evaluate.    Past medical history  Hypertension   Depression/anxiety  Chronic pain     Past surgical history  Unknown    Family history  Unknown    Social history  She [facility] a [facility].     Home medications  olmesartan (BENICAR) 20 MG tablet Take 1 tablet by mouth daily   sertraline (ZOLOFT) 50 MG tablet Take 1 tablet by mouth daily   traZODone (DESYREL) 50 MG tablet Take 1 tablet by mouth nightly     No Known Allergies     Review of Systems   Unable to perform ROS: Mental status change       Objective:     Patient Vitals for the past 24 hrs:   BP Temp Temp src Pulse Resp SpO2 Weight   06/24/24 0745 (!) 168/73 97.1 °F (36.2 °C) Oral 62 19 -- --   06/24/24 0600 -- -- -- -- -- -- 59.4 kg (131 lb)   06/24/24 0305 (!) 189/65 97.7 °F (36.5 °C) Oral 60 16 97 % --   06/23/24 2330 (!) 101/58 -- -- 52 18 97 % --   06/23/24 2135 (!) 121/102 -- -- -- -- -- --   06/23/24 2006 (!) 174/87 98.1 °F (36.7 °C) Oral 60 18 97 % --   06/23/24 1401 (!) 171/73 -- -- 60 -- -- --   06/23/24 1201 (!) 145/85 97.3 °F (36.3 °C) Oral 56 18 96 % --   06/23/24 1006 (!) 165/77 -- -- 55 -- -- --     Physical Exam  Constitutional:       Comments: Arousable    Cardiovascular:      Rate and Rhythm: Normal rate and regular rhythm.   Pulmonary:      Effort: Pulmonary effort is normal. No respiratory distress.   Abdominal:      General: There is no distension.      Palpations: Abdomen is soft.   Skin:     Coloration: Skin is pale.  ml/min/1.73m2    Calcium 9.0 8.5 - 10.1 MG/DL   POCT Glucose    Collection Time: 06/24/24  7:45 AM   Result Value Ref Range    POC Glucose 113 65 - 117 mg/dL    Performed by: Patel Young PCT        Assessmen/Plan:     Thoracoabdominal aortic aneurysm  Incidental finding on CT/CTA.  Asymptomatic.  Patient is not a candidate for rectification due to her age and comorbid conditions.  No further vascular evaluation, procedure or follow up needed.   Vascular surgery signing off.  We appreciate the opportunity to care for Ms. Melara. Please re consult as needed.      Acute stroke   Malignant hypertension  -management per Neurology and primary team   -Asa and statin    Acute kidney injury  Dehydration   Chronic renal insufficiency    Ground level fall     VTE Prophylaxis:  LMWH    Disposition:  SNF placement     Signed By: Lluvia Perez [facility] Services[facility]-BC    June 24, 2024

## 2024-06-24 NOTE — PLAN OF CARE
Patient is stable and comfortable. Up in chair. Eating and drinking well.  Hydralazine given for elevated BP. CT was done.  Hourly purposeful rounding continues.     Problem: Skin/Tissue Integrity  Goal: Absence of new skin breakdown  Description: 1.  Monitor for areas of redness and/or skin breakdown  2.  Assess vascular access sites hourly  3.  Every 4-6 hours minimum:  Change oxygen saturation probe site  4.  Every 4-6 hours:  If on nasal continuous positive airway pressure, respiratory therapy assess nares and determine need for appliance change or resting period.  Outcome: Progressing     Problem: ABCDS Injury Assessment  Goal: Absence of physical injury  Outcome: Progressing

## 2024-06-24 NOTE — PROGRESS NOTES
Hospitalist Progress Note    NAME:   Jeni Melara   : 1928   MRN: [id number]     Date/Time: 2024 5:16 PM  Patient PCP: No primary care provider on file.    Estimated discharge date:   Barriers: IPR, echo    Assessment / Plan:    Acute CVA  dysarthria, Right facial droop  Continue telemetry  Appreciate neurology recs  CT head unremarkable  MRI Acute infarction involving corona radiata, extending inferiorly to the  posterior lentiform nucleus.  2. Moderate atrophy with abundant chronic small vessel ischemic disease in the  white matter.  Sbp  goal <140/90  Started on aspirin  CTA head and neck pending  LDL >96, started statin 40mg daily  A1c 5.4  -PT OT eval-IPr  Speech eval puréed diet  Echo pending  CTA head and neck moderate stenosis right A1 origin, 35 mm aneurysm descending aorta thoracic, ongoing subacute ischemia left corona radiator    Cont aspirin, statin  Neuro recs-Patient will be at an increased risk of future stroke secondary to A-fib if not on AC.  However, benefits versus risks discharge and should be made given that she is 89, ? risk for fall.  If she were to start AC, should DC aspirin from neurostandpoint.     -Will discuss with patient and family risk versus benefit and will decide regarding anticoagulation  -Will also discussed with neurology . whether any need to add Plavix given stenosis on CTA      Pafib- new onset  Appreciate card consult  Episodes of bradycardia on tele  Plan EP consult tmrw am  Hold of NOAC  due to high risk for fall and pending neuro recs    Dc cardizem drip  HR 60s on monitor  Will hold off adding bb/cardizem scheduled for now  Can use iv low dose cardizem if HR > 120 and sustaining  EP evaluated the patient not a candidate for any pacemaker.  Cleared from EP standpoint for discharge        Rt shoulder pain  Chronic per family  Xray shoulder no acute findings      Status post 4 and right knee pain, on admission   From [facility]  (!) 174/87 98.1 °F (36.7 °C) Oral 60 18 97 % -- --           Intake/Output Summary (Last 24 hours) at 6/24/2024 1716  Last data filed at 6/24/2024 0219  Gross per 24 hour   Intake 840 ml   Output 300 ml   Net 540 ml          I had a face to face encounter and independently examined this patient on 6/24/2024, as outlined below:  PHYSICAL EXAM:  General: Alert, cooperative  EENT:  EOMI. Anicteric sclerae.  Resp:  CTA bilaterally, no wheezing or rales.  No accessory muscle use  CV:  Regular  rhythm,  No edema  GI:  Soft, Non distended, Non tender.  +Bowel sounds  Neurologic:  Alert and oriented X 3, speech improved, patient had endless  Decreased range of motion right arm-?  Pain   psych:   Good insight. Not anxious nor agitated  Skin:  No rashes.  No jaundice    Reviewed most current lab test results and cultures  YES  Reviewed most current radiology test results   YES  Review and summation of old records today    NO  Reviewed patient's current orders and MAR    YES  PMH/SH reviewed - no change compared to H&P    Procedures: see electronic medical records for all procedures/Xrays and details which were not copied into this note but were reviewed prior to creation of Plan.      LABS:  I reviewed today's most current labs and imaging studies.  Pertinent labs include:  Recent Labs     06/22/24 0414 06/23/24  0045 06/24/24  0324   WBC 7.7 6.0 6.4   HGB 10.9* 9.6* 10.3*   HCT 32.4* 29.0* 31.3*    235 278       Recent Labs     06/21/24  1724 06/22/24  0414 06/23/24  0045 06/24/24  0324   * 137 137 137   K 4.4 4.0 3.9 3.9    107 107 108   CO2 27 24 25 21   GLUCOSE 106* 109* 95 103*   BUN 22* 20 23* 23*   CREATININE 1.13* 1.09* 1.12* 1.11*   CALCIUM 9.3 8.5 8.4* 9.0   MG 2.0  --  1.9  --    BILITOT 0.5 0.7  --   --    AST 18 16  --   --    ALT 20 16  --   --          Signed: Norma Garcia MD

## 2024-06-24 NOTE — PROGRESS NOTES
Bedside shift change report given to georges tariq (oncoming nurse) by georges huang (offgoing nurse). Report included the following information Nurse Handoff Report, ED Encounter Summary, Adult Overview, MAR, Recent Results, and Cardiac Rhythm thom .     End of Shift Note    Bedside shift change report given to eugenia (oncoming nurse) by KAYLIE PARIKH RN (offgoing nurse).  Report included the following information SBAR, ED Summary, MAR, Recent Results, and Cardiac Rhythm thom    Shift worked:  7p-7a     Shift summary and any significant changes:     na     Concerns for physician to address:  na     Zone phone for oncoming shift:          Activity:     Number times ambulated in hallways past shift: 0  Number of times OOB to chair past shift: 0    Cardiac:   Cardiac Monitoring: Yes           Access:  Current line(s): PIV     Genitourinary:   Urinary status: voiding and external catheter    Respiratory:      Chronic home O2 use?: NO  Incentive spirometer at bedside: YES       GI:     Current diet:  ADULT DIET; Dysphagia - Pureed  Passing flatus: YES  Tolerating current diet: YES       Pain Management:   Patient states pain is manageable on current regimen: YES    Skin:     Interventions: internal/external urinary devices    Patient Safety:  Fall Score:    Interventions: bed/chair alarm and gripper socks       Length of Stay:  Expected LOS: 4  Actual LOS: 0      KAYLIE PARIKH RN

## 2024-06-24 NOTE — PLAN OF CARE
Speech LAnguage Pathology TREATMENT    Patient: Jeni Melara (89 y.o. female)  Date: 6/24/2024  Primary Diagnosis: Age-related physical debility [R54]  CVA (cerebrovascular accident due to intracerebral hemorrhage) ([facility]) [I61.9]       Precautions: aspiration precautions, Fall Risk, Bed Alarm                  ASSESSMENT :  Patient alert with partially eaten breakfast tray at bedside (puree/thin liquids). Noted edentulous status and patient reports typically eating soft foods/states foods are good. Patient agreeable to trials of thin via cup and puree. No overt clinical s/s aspiration noted across PO trials. Patient pleasantly refused solid trial this date and states she would not normally eat foods like that. Question if puree diet at/near baseline diet.     Additionally, patient continues with mild dysarthria characterized by imprecise articulation and reduced speech intelligibility. Patient reports this is not her normal speech and reports people are having trouble understanding her. SLP introduced motor speech strategies using verbal direction and modeling. SLP will continue to follow.     Patient will benefit from skilled intervention to address the above impairments.     PLAN :  Recommendations and Planned Interventions:  Diet: Puree and thin liquids  --Medications whole in puree (I.e. applesauce)   --Upright all PO intake   --Single bites/sips   --Oral hygiene 2-3x/day    S.L.O.B.  Slow down (slow your rate of speech down)  Loud volume (talking louder makes it easier for others to understand)  Over-articulate (opening your mouth wider, using a large oral cavity, to over-enunciate the word)  Breath support (take a deep, diaphragmatic breath prior to speaking)      Recommend next SLP session: ensure diet tolerance (patient edentulous and reports liking current diet). Review motor speech strategies     Acute SLP Services: Yes, SLP will continue to follow per plan of care.    Discharge Recommendations: Continue to

## 2024-06-24 NOTE — CONSULTS
[facility]  [address] [place] [address] [phone]  [email or web address]       PLEASE NOTE THAT WE CONFIRMED WITH THE REFERRING PHYSICIAN PRIOR TO SEEING THE PATIENT THAT THE PATIENT IS BEING REFERRED FOR INITIAL HOSPITAL EVALUATION AND FOR LONG-TERM ONGOING CARDIAC CARE.      Date of  Admission: 6/21/2024  4:11 PM     Admission type:Emergency    Jeni Melara is a 89 y.o. female admitted for Age-related physical debility [R54]  CVA (cerebrovascular accident due to intracerebral hemorrhage) ([facility]) [I61.9]. Asked to see regarding sinus thom to 39 bpm. S/p fall    Patient Active Problem List    Diagnosis Date Noted    SSS (sick sinus syndrome) ([facility]) 06/24/2024    CVA (cerebrovascular accident due to intracerebral hemorrhage) ([facility]) 06/23/2024    Age-related physical debility 06/21/2024      No primary care provider on file.  History reviewed. No pertinent past medical history.   No past surgical history on file.  No Known Allergies      No family history on file.   Current Facility-Administered Medications   Medication Dose Route Frequency    hydrALAZINE (APRESOLINE) injection 10 mg  10 mg IntraVENous Q6H PRN    0.9 % sodium chloride infusion   IntraVENous Continuous    losartan (COZAAR) tablet 50 mg  50 mg Oral Daily    sodium chloride flush 0.9 % injection 5-40 mL  5-40 mL IntraVENous 2 times per day    sodium chloride flush 0.9 % injection 5-40 mL  5-40 mL IntraVENous PRN    0.9 % sodium chloride infusion   IntraVENous PRN    ondansetron (ZOFRAN-ODT) disintegrating tablet 4 mg  4 mg Oral Q8H PRN    Or    ondansetron (ZOFRAN) injection 4 mg  4 mg IntraVENous Q6H PRN    polyethylene glycol (GLYCOLAX) packet 17 g  17 g Oral Daily PRN    rosuvastatin (CRESTOR) tablet 40 mg  40 mg Oral Nightly    aspirin chewable tablet 81 mg  81 mg Oral Daily    Or    aspirin suppository 300 mg  300 mg Rectal Daily    enoxaparin Sodium (LOVENOX) injection 30 mg  30 mg

## 2024-06-25 ENCOUNTER — TELEPHONE (OUTPATIENT)
Facility: HOSPITAL | Age: 89
End: 2024-06-25

## 2024-06-25 ENCOUNTER — TELEPHONE (OUTPATIENT)
Age: 89
End: 2024-06-25

## 2024-06-25 PROBLEM — Z71.89 ADVANCED CARE PLANNING/COUNSELING DISCUSSION: Status: ACTIVE | Noted: 2024-06-25

## 2024-06-25 PROBLEM — Z71.89 GOALS OF CARE, COUNSELING/DISCUSSION: Status: ACTIVE | Noted: 2024-06-25

## 2024-06-25 PROBLEM — Z51.5 PALLIATIVE CARE ENCOUNTER: Status: ACTIVE | Noted: 2024-06-25

## 2024-06-25 LAB
ANION GAP SERPL CALC-SCNC: 4 MMOL/L (ref 5–15)
BASOPHILS # BLD: 0 K/UL (ref 0–0.1)
BASOPHILS NFR BLD: 1 % (ref 0–1)
BUN SERPL-MCNC: 21 MG/DL (ref 6–20)
BUN/CREAT SERPL: 21 (ref 12–20)
CALCIUM SERPL-MCNC: 8.9 MG/DL (ref 8.5–10.1)
CHLORIDE SERPL-SCNC: 110 MMOL/L (ref 97–108)
CO2 SERPL-SCNC: 23 MMOL/L (ref 21–32)
CREAT SERPL-MCNC: 1.02 MG/DL (ref 0.55–1.02)
DIFFERENTIAL METHOD BLD: ABNORMAL
EOSINOPHIL # BLD: 0.1 K/UL (ref 0–0.4)
EOSINOPHIL NFR BLD: 2 % (ref 0–7)
ERYTHROCYTE [DISTWIDTH] IN BLOOD BY AUTOMATED COUNT: 13.4 % (ref 11.5–14.5)
GLUCOSE BLD STRIP.AUTO-MCNC: 102 MG/DL (ref 65–117)
GLUCOSE BLD STRIP.AUTO-MCNC: 107 MG/DL (ref 65–117)
GLUCOSE BLD STRIP.AUTO-MCNC: 116 MG/DL (ref 65–117)
GLUCOSE BLD STRIP.AUTO-MCNC: 99 MG/DL (ref 65–117)
GLUCOSE SERPL-MCNC: 102 MG/DL (ref 65–100)
HCT VFR BLD AUTO: 32.1 % (ref 35–47)
HGB BLD-MCNC: 10.9 G/DL (ref 11.5–16)
IMM GRANULOCYTES # BLD AUTO: 0 K/UL (ref 0–0.04)
IMM GRANULOCYTES NFR BLD AUTO: 1 % (ref 0–0.5)
LYMPHOCYTES # BLD: 1.1 K/UL (ref 0.8–3.5)
LYMPHOCYTES NFR BLD: 19 % (ref 12–49)
MCH RBC QN AUTO: 29 PG (ref 26–34)
MCHC RBC AUTO-ENTMCNC: 34 G/DL (ref 30–36.5)
MCV RBC AUTO: 85.4 FL (ref 80–99)
MONOCYTES # BLD: 0.5 K/UL (ref 0–1)
MONOCYTES NFR BLD: 9 % (ref 5–13)
NEUTS SEG # BLD: 4.2 K/UL (ref 1.8–8)
NEUTS SEG NFR BLD: 68 % (ref 32–75)
NRBC # BLD: 0 K/UL (ref 0–0.01)
NRBC BLD-RTO: 0 PER 100 WBC
PLATELET # BLD AUTO: 282 K/UL (ref 150–400)
PMV BLD AUTO: 9.6 FL (ref 8.9–12.9)
POTASSIUM SERPL-SCNC: 4.1 MMOL/L (ref 3.5–5.1)
RBC # BLD AUTO: 3.76 M/UL (ref 3.8–5.2)
SERVICE CMNT-IMP: NORMAL
SODIUM SERPL-SCNC: 137 MMOL/L (ref 136–145)
WBC # BLD AUTO: 6 K/UL (ref 3.6–11)

## 2024-06-25 PROCEDURE — 6370000000 HC RX 637 (ALT 250 FOR IP): Performed by: INTERNAL MEDICINE

## 2024-06-25 PROCEDURE — 97530 THERAPEUTIC ACTIVITIES: CPT

## 2024-06-25 PROCEDURE — 99233 SBSQ HOSP IP/OBS HIGH 50: CPT

## 2024-06-25 PROCEDURE — 6370000000 HC RX 637 (ALT 250 FOR IP): Performed by: NURSE PRACTITIONER

## 2024-06-25 PROCEDURE — 92526 ORAL FUNCTION THERAPY: CPT

## 2024-06-25 PROCEDURE — 97112 NEUROMUSCULAR REEDUCATION: CPT

## 2024-06-25 PROCEDURE — 97535 SELF CARE MNGMENT TRAINING: CPT

## 2024-06-25 PROCEDURE — 2580000003 HC RX 258: Performed by: STUDENT IN AN ORGANIZED HEALTH CARE EDUCATION/TRAINING PROGRAM

## 2024-06-25 PROCEDURE — 92507 TX SP LANG VOICE COMM INDIV: CPT

## 2024-06-25 PROCEDURE — 36415 COLL VENOUS BLD VENIPUNCTURE: CPT

## 2024-06-25 PROCEDURE — 80048 BASIC METABOLIC PNL TOTAL CA: CPT

## 2024-06-25 PROCEDURE — 6370000000 HC RX 637 (ALT 250 FOR IP): Performed by: STUDENT IN AN ORGANIZED HEALTH CARE EDUCATION/TRAINING PROGRAM

## 2024-06-25 PROCEDURE — 85025 COMPLETE CBC W/AUTO DIFF WBC: CPT

## 2024-06-25 PROCEDURE — 1100000000 HC RM PRIVATE

## 2024-06-25 PROCEDURE — 6360000002 HC RX W HCPCS: Performed by: NURSE PRACTITIONER

## 2024-06-25 PROCEDURE — 82962 GLUCOSE BLOOD TEST: CPT

## 2024-06-25 PROCEDURE — 99223 1ST HOSP IP/OBS HIGH 75: CPT | Performed by: INTERNAL MEDICINE

## 2024-06-25 PROCEDURE — 97116 GAIT TRAINING THERAPY: CPT

## 2024-06-25 RX ORDER — AMLODIPINE BESYLATE 5 MG/1
5 TABLET ORAL DAILY
Status: DISCONTINUED | OUTPATIENT
Start: 2024-06-25 | End: 2024-06-26 | Stop reason: HOSPADM

## 2024-06-25 RX ORDER — LOSARTAN POTASSIUM 100 MG/1
100 TABLET ORAL DAILY
Status: DISCONTINUED | OUTPATIENT
Start: 2024-06-26 | End: 2024-06-26 | Stop reason: HOSPADM

## 2024-06-25 RX ORDER — LOSARTAN POTASSIUM 50 MG/1
50 TABLET ORAL ONCE
Status: DISCONTINUED | OUTPATIENT
Start: 2024-06-25 | End: 2024-06-26 | Stop reason: HOSPADM

## 2024-06-25 RX ADMIN — APIXABAN 2.5 MG: 2.5 TABLET, FILM COATED ORAL at 21:45

## 2024-06-25 RX ADMIN — ENOXAPARIN SODIUM 30 MG: 100 INJECTION SUBCUTANEOUS at 08:45

## 2024-06-25 RX ADMIN — ACETAMINOPHEN 650 MG: 325 TABLET ORAL at 05:41

## 2024-06-25 RX ADMIN — SODIUM CHLORIDE, PRESERVATIVE FREE 10 ML: 5 INJECTION INTRAVENOUS at 08:45

## 2024-06-25 RX ADMIN — ROSUVASTATIN CALCIUM 40 MG: 40 TABLET, FILM COATED ORAL at 21:44

## 2024-06-25 RX ADMIN — SODIUM CHLORIDE, PRESERVATIVE FREE 10 ML: 5 INJECTION INTRAVENOUS at 21:45

## 2024-06-25 RX ADMIN — ASPIRIN 81 MG: 81 TABLET, CHEWABLE ORAL at 08:45

## 2024-06-25 RX ADMIN — LOSARTAN POTASSIUM 50 MG: 50 TABLET, FILM COATED ORAL at 08:45

## 2024-06-25 RX ADMIN — AMLODIPINE BESYLATE 5 MG: 5 TABLET ORAL at 17:20

## 2024-06-25 RX ADMIN — TRAZODONE HYDROCHLORIDE 50 MG: 50 TABLET ORAL at 21:44

## 2024-06-25 RX ADMIN — ACETAMINOPHEN 650 MG: 325 TABLET ORAL at 21:44

## 2024-06-25 RX ADMIN — SERTRALINE HYDROCHLORIDE 50 MG: 50 TABLET ORAL at 08:45

## 2024-06-25 NOTE — PLAN OF CARE
Problem: Discharge Planning  Goal: Discharge to home or other facility with appropriate resources  Outcome: Progressing     Problem: Skin/Tissue Integrity  Goal: Absence of new skin breakdown  Description: 1.  Monitor for areas of redness and/or skin breakdown  2.  Assess vascular access sites hourly  3.  Every 4-6 hours minimum:  Change oxygen saturation probe site  4.  Every 4-6 hours:  If on nasal continuous positive airway pressure, respiratory therapy assess nares and determine need for appliance change or resting period.  6/24/2024 2010 by Jarrod Cline RN  Outcome: Progressing  6/24/2024 1842 by Campos Galaviz RN  Outcome: Progressing     Problem: Safety - Adult  Goal: Free from fall injury  6/24/2024 2010 by Jarrod Cline RN  Outcome: Progressing  6/24/2024 1842 by Campos Galaviz RN  Outcome: Progressing     Problem: ABCDS Injury Assessment  Goal: Absence of physical injury  6/24/2024 2010 by Jarrod Cline RN  Outcome: Progressing  6/24/2024 1842 by Campos Galaviz RN  Outcome: Progressing     Problem: Physical Therapy - Adult  Goal: By Discharge: Performs mobility at highest level of function for planned discharge setting.  See evaluation for individualized goals.  Description: FUNCTIONAL STATUS PRIOR TO ADMISSION: Patient was independent and active without use of DME. The patient  was independent for basic and instrumental ADLs.    HOME SUPPORT PRIOR TO ADMISSION: Patient was living alone at independent living at the [facility]. Family in the area.    Physical Therapy Goals  Initiated 6/22/2024  1.  Patient will move from supine to sit and sit to supine, scoot up and down, and roll side to side in bed with minimal assistance within 7 day(s).    2.  Patient will perform sit to stand with minimal assistance within 7 day(s).  3.  Patient will transfer from bed to chair and chair to bed with minimal assistance using the least restrictive device within 7 day(s).  4.  Patient will ambulate with moderate  assistance for 25 feet with the least restrictive device within 7 day(s).   5.  Patient will improve Martinez Balance score by 7 points within 7 days.         6/24/2024 1308 by Natalie Bates PT  Outcome: Progressing     Problem: Occupational Therapy - Adult  Goal: By Discharge: Performs self-care activities at highest level of function for planned discharge setting.  See evaluation for individualized goals.  Description: FUNCTIONAL STATUS PRIOR TO ADMISSION:  Patient was independent in ADLs and functional mobility.    ,  ,  ,  ,  ,  ,  ,  ,  ,  ,       HOME SUPPORT: Patient lived alone on independent side [facility].    Occupational Therapy Goals:  Initiated 6/22/2024  1.  Patient will perform grooming while incorporating RUE with Stand by Assist within 7 day(s).  2.  Patient will perform upper body dressing with Minimal Assist within 7 day(s).  3.  Patient will perform lower body dressing with Moderate Assist within 7 day(s).  4.  Patient will perform toilet transfers with Minimal Assist  within 7 day(s).  5.  Patient will perform all aspects of toileting with Minimal Assist within 7 day(s).  6.  Patient will participate in upper extremity therapeutic exercise/activities with Stand by Assist within 7 day(s).    7.  Patient will utilize energy conservation techniques during functional activities with no cues within 7 day(s).  8.  Patient will improve their Fugl Barton score by 5 points in prep for ADLs within 7 days.      6/24/2024 1346 by Iliana Rice, OT  Outcome: Progressing     Problem: SLP Adult - Impaired Swallowing  Goal: By Discharge: Advance to least restrictive diet without signs or symptoms of aspiration for planned discharge setting.  See evaluation for individualized goals.  Description: Speech therapy goals  Initiated 6/22/2024   1. Patient will tolerate puree/thin liquid diet without s/s of aspiration or adverse effects within 7 days   2. Patient will tolerate solid trials with timely and complete

## 2024-06-25 NOTE — TELEPHONE ENCOUNTER
Hello,    Can this patient be scheduled for hospital follow-up in 4 to 8 weeks with any other neurology providers?    Thanks,  Avelina

## 2024-06-25 NOTE — PROGRESS NOTES
HOSPITAL NEUROLOGY NOTE     Chief Complaint   Patient presents with    Fatigue        HPI  History excerpted from Dr. Foreman's note on 6/22/2024  Jeni Melara is a 89 y.o. female  who presents from nursing facility after having ground-level fall due to sudden onset bilateral lower extremity weakness.  Patient had stroke code called overnight for slurred speech however is edentulous and therefore was canceled.  Then she had another code stroke called overnight for right facial droop as well as not moving her right arm.  It is noted that her right arm has significant pain and this is limiting her motion.  Head CT overnight did not show any acute findings.     It is difficult to understand her given her slurred speech.  She states that she never wears dentures.  Apparently according to the family, they feel that some of her symptoms are behavioral and chronic in nature and happen intermittently.     She does mention that she has had chronic right shoulder pain.     She has no other acute neurological symptoms.       INTERIM DATA: At the time of my evaluation this morning, patient was awake, alert, and oriented.  She followed commands in all extremities with the exception of pain/discomfort in the right arm which is chronic per patient.  She denied any new weakness or numbness in any extremities, visual disturbances.  Speech has improved  and had slight difficulty with swallowing, mostly liquid.    PT/OT at bedside      ROS  A ten system review of constitutional, cardiovascular, respiratory, musculoskeletal, endocrine, skin, SHEENT, genitourinary, psychiatric and neurologic systems was obtained and is unremarkable except as stated in HPI     PMH  History reviewed. No pertinent past medical history.    ALLERGIES  No Known Allergies    PHYSICAL EXAMINATION:     General:   General appearance: Pt is in no acute distress   Distal pulses are preserved  Fundoscopic exam: attempted    Neurological Examination:   Mental Status:  evaluation of pulmonary  embolism to the first subsegmental arterial level. There is no pulmonary  embolism to this level.  THYROID: No nodule.  MEDIASTINUM: No mass or lymphadenopathy.  LAURO: No mass or lymphadenopathy.  HEART: Enlarged. Coronary artery calcifications are present.  ESOPHAGUS: No wall thickening or dilatation.  TRACHEA/BRONCHI: Patent.  PLEURA: No effusion or pneumothorax.  LUNGS: 5 mm right middle lobe pulmonary nodule. No mass or airspace disease  disease.  UPPER ABDOMEN: Please see separately dictated CT of the abdomen.  BONES: No aggressive bone lesion or fracture.    Impression  1.  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  No aortic dissection or rupture.  2.  Indeterminate 5 mm right pulmonary nodule. In a low-risk patient, no  follow-up is required. In a high-risk patient, follow-up CT chest in 12 months  is optional per Fleischner guidelines..    Electronically signed by Richelle Jackson         LAB DATA REVIEWED:      No results displayed because visit has over 200 results.          Vitals:    06/25/24 0227 06/25/24 0514 06/25/24 0742 06/25/24 0845   BP: (!) 157/67  (!) 193/80 (!) 193/80   Pulse: 55  63    Resp: 16  18    Temp: 98.2 °F (36.8 °C)  98.6 °F (37 °C)    TempSrc: Oral  Oral    SpO2: 95%  94%    Weight:  59.8 kg (131 lb 13.4 oz)     Height:            Current Facility-Administered Medications   Medication Dose Route Frequency    [START ON 6/26/2024] losartan (COZAAR) tablet 100 mg  100 mg Oral Daily    losartan (COZAAR) tablet 50 mg  50 mg Oral Once    hydrALAZINE (APRESOLINE) injection 10 mg  10 mg IntraVENous Q6H PRN    sodium chloride flush 0.9 % injection 5-40 mL  5-40 mL IntraVENous 2 times per day    sodium chloride flush 0.9 % injection 5-40 mL  5-40 mL IntraVENous PRN    0.9 % sodium chloride infusion   IntraVENous PRN    ondansetron (ZOFRAN-ODT) disintegrating tablet 4 mg  4 mg Oral Q8H PRN    Or    ondansetron (ZOFRAN) injection 4 mg  4

## 2024-06-25 NOTE — PLAN OF CARE
Problem: Physical Therapy - Adult  Goal: By Discharge: Performs mobility at highest level of function for planned discharge setting.  See evaluation for individualized goals.  Description: FUNCTIONAL STATUS PRIOR TO ADMISSION: Patient was independent and active without use of DME. The patient  was independent for basic and instrumental ADLs.    HOME SUPPORT PRIOR TO ADMISSION: Patient was living alone at independent living at the [place]. Family in the area.    Physical Therapy Goals  Initiated 6/22/2024  1.  Patient will move from supine to sit and sit to supine, scoot up and down, and roll side to side in bed with minimal assistance within 7 day(s).    2.  Patient will perform sit to stand with minimal assistance within 7 day(s).  3.  Patient will transfer from bed to chair and chair to bed with minimal assistance using the least restrictive device within 7 day(s).  4.  Patient will ambulate with moderate assistance for 25 feet with the least restrictive device within 7 day(s).   5.  Patient will improve Martinez Balance score by 7 points within 7 days.         Outcome: Progressing     PHYSICAL THERAPY TREATMENT    Patient: Jeni Melara (89 y.o. female)  Date: 6/25/2024  Diagnosis: Age-related physical debility [R54]  CVA (cerebrovascular accident due to intracerebral hemorrhage) ([facility]) [I61.9] Age-related physical debility      Precautions: Fall Risk, Bed Alarm                      ASSESSMENT:  Patient continues to benefit from skilled PT services and is progressing towards goals. Patient received in bed and agreeable to participate, very eager to mobilize and states \"let me do it by myself if I can\".  Patient with good initiation of sequence to come to sit, needed only min assist to square up at EOB.  Patient stood with min assist x 2 (hand held) and was able to sidestep to chair with min to mod assist x 2 and slow pace, requires assist to weight shift and unable to clear floor with right foot but can slide to adduct.

## 2024-06-25 NOTE — PLAN OF CARE
Problem: Neurosensory - Adult  Goal: Achieves stable or improved neurological status  6/25/2024 0823 by Niurka Mejia RN  Outcome: Not Progressing  6/24/2024 1842 by Campos Galaviz RN  Outcome: Progressing     Problem: Discharge Planning  Goal: Discharge to home or other facility with appropriate resources  6/25/2024 0823 by Niurka Mejia RN  Outcome: Not Progressing  6/24/2024 2010 by Jarrod Cline RN  Outcome: Progressing     Problem: Neurosensory - Adult  Goal: Achieves stable or improved neurological status  6/25/2024 0823 by Niurka Mejia RN  Outcome: Not Progressing  6/24/2024 1842 by Campos Galaviz RN  Outcome: Progressing     Problem: Pain  Goal: Verbalizes/displays adequate comfort level or baseline comfort level  Outcome: Not Progressing     Problem: Chronic Conditions and Co-morbidities  Goal: Patient's chronic conditions and co-morbidity symptoms are monitored and maintained or improved  Outcome: Not Progressing

## 2024-06-25 NOTE — CONSULTS
Palliative Medicine  Patient Name: Jeni Melara  YOB: 1928  MRN: [id number]  Age: 89 y.o.  Gender: female    Date of Initial Consult: 6/24/24  Date of Service: 6/25/2024  Time: 1:38 PM  Provider: Aubree Martinez MD  Hospital Day: 5  Admit Date: 6/21/2024  Referring Provider: Anastacio Duran MD      Reasons for Consultation:  Goals of Care    HISTORY OF PRESENT ILLNESS (HPI):   Jeni Melraa is a 89 y.o. female with a past medical history of hypertension, insomnia, depression, recent diagnosis of lactose intolerance with diarrhea resolved after changing her diet.  Who was admitted on 6/21/2024 from independent living at [facility] with a diagnosis of of general debility and fall, hit head and right knee, Overnight patient was noted to have right facial droop inability to move right arm and slurred speech, MRI found to have acute stroke in the left corona radiata extending into posterior lentiform nucleus.  This admission she was noted to have a incidental finding of thoracoabdominal aortic aneurysm per vascular she is asymptomatic not a candidate for any surgery due to her age and comorbidities.  6/24: Episode of thom cardia to 39 bpm minute with paroxysmal atrial fibrillation, for now she is asymptomatic cardiology to follow as outpatient.             Psychosocial: she is she is she is [place], she has 3 sons and 1 daughter Tim( local) Brad Melgar( lives in [place]) and Mónica (Lives in [place] not  in touch with her mom ).  patient was living independently in [place] in her own home until December 2023, when she became lonely, was moved by her granddaughter Lluvia, independent setting at [facility] in [place],  in December 2023, she was getting adjusted in [facility] independent living , made some friends, at baseline her mentation is sharp.  Her granddaughter Lluvia is very involved in her care.    PALLIATIVE DIAGNOSES:    Palliative care encounter  Fall   Acute stroke  Bradycardia asymptomatic   Weakness of the  discuss with social work during IDT    ESAS Anxiety: Anxiety Score: 2    ESAS Depression:          LAB AND IMAGING FINDINGS:   Objective data reviewed:  labs, images, records, medication use, vitals, and chart     FINAL COMMENTS   Thank you for allowing Palliative Medicine to participate in the care of Jeni Melara.    Only check if applicable and billing time based rather than MDM  [] The total encounter time on this service date was ____ minutes which was spent performing a face-to-face encounter and personally completing the provider-level activities documented in the note. This includes time spent prior to the visit and after the visit in direct care of the patient. This time does not include time spent in any separately reportable services.    Electronically signed by   Aubree Martinez MD  Palliative Care Team  on 6/25/2024 at 1:38 PM

## 2024-06-25 NOTE — CARE COORDINATION
Transition of Care Plan: [facility], then return to [facility]     RUR: 13% (low RUR)   Prior Level of Functioning: Independent in her ADLs. Her family assisted with some IADLs.  Disposition: [facility], then return to [facility]  If SNF or IPR: Date FOC offered: 6/22/24  Date FOC received: 6/22/24 - [facility] and [facility]  Accepting facility: [facility]  Date authorization started with reference number: 6/25, Ref # unknown  Date authorization received and expires: 6/25-TBD  Follow up appointments: defer to SNF.  DME needed: defer to SNF.  Transportation at discharge: [facility] for 12pm on 6/26/2024 to [facility], bed pending, nursing call report to: [phone]. [facility] PCS, FS, Inpt DNR, H&P and completed transportation folder left on bedside chart.  Completed [facility] magnet left outside room.  IM/IMM Medicare/ letter given: 2nd IM done 6/25/2024.  Is patient a [place] and connected with [facility]? No              If yes, was  transfer form completed and VA notified? N/A  Caregiver Contact: Tim Melara - [place] - [phone] - [email or web address]; Lluvia Gilbert - TR - [phone] - [email or web address]  Discharge Caregiver contacted prior to discharge? Aware of pending discharge tomorrow.  Care Conference needed? No  Barriers to discharge: [facility] bedside eval    0850 to 0909 -     CM contacted sonTim, who is aware that if [facility] cannot accept, SNF options may likely be needed.  Tim will review SNF choices and get back to  while IPR is pending.  He confirmed that he brought patient from [place] in the end of September 2023 and is considering LTC for patient in [place] as his family cannot take patient in, if patient cannot return to Hospitals in [place].  He is aware that if he chooses to place patient in LTC, it will be private pay since she likely does not have insurance coverage for LTC at this time.    1005 - CM received message from [facility] regarding  Saritha (Son) and Lluvia Gilbert (GDTR)   The Patient and/Or Patient Representative agree with the Discharge Plan? Yes   Freedom of Choice list was provided with basic dialogue that supports the patient's individualized plan of care/goals, treatment preferences, and shares the quality data associated with the providers?  Yes       GLADIS RamirezN, RN    Care Management  [phone]

## 2024-06-25 NOTE — PROGRESS NOTES
Bedside shift change report given to georges tariq (oncoming nurse) by georges bello (offgoing nurse). Report included the following information Nurse Handoff Report, ED Encounter Summary, Adult Overview, MAR, Recent Results, and Cardiac Rhythm thom .     End of Shift Note    Bedside shift change report given to rocky (oncoming nurse) by KAYLIE PARIKH RN (offgoing nurse).  Report included the following information SBAR, ED Summary, MAR, Recent Results, and Cardiac Rhythm thom    Shift worked:  7p-7a     Shift summary and any significant changes:     na     Concerns for physician to address:  na     Zone phone for oncoming shift:          Activity:     Number times ambulated in hallways past shift: 0  Number of times OOB to chair past shift: 0    Cardiac:   Cardiac Monitoring: Yes           Access:  Current line(s): PIV     Genitourinary:   Urinary status: voiding and external catheter    Respiratory:      Chronic home O2 use?: NO  Incentive spirometer at bedside: YES       GI:     Current diet:  ADULT DIET; Dysphagia - Pureed  Passing flatus: YES  Tolerating current diet: YES       Pain Management:   Patient states pain is manageable on current regimen: YES    Skin:     Interventions: increase time out of bed and internal/external urinary devices    Patient Safety:  Fall Score:    Interventions: bed/chair alarm       Length of Stay:  Expected LOS: 5  Actual LOS: 1      KAYLIE PARIKH RN

## 2024-06-25 NOTE — PROGRESS NOTES
Palliative Medicine  Per Dr. Martinez: Jeni Melara is a 89 y.o. female with a past medical history of hypertension, insomnia, depression, recent diagnosis of lactose intolerance with diarrhea resolved after changing her diet.  Who was admitted on 2024 from [facility] [place] with a diagnosis of of general debility and fall, hit head and right knee, Overnight patient was noted to have right facial droop inability to move right arm and slurred speech, MRI found to have acute stroke in the left corona radiata extending into posterior lentiform nucleus.  This admission she was noted to have a incidental finding of thoracoabdominal aortic aneurysm per vascular she is asymptomatic not a candidate for any surgery due to her age and comorbidities.  : Episode of thom cardia to 39 bpm minute with paroxysmal atrial fibrillation, for now she is asymptomatic cardiology to follow as outpatient.                                                                                                            Code Status: DNR DDNR signed with Dr. Martinez on 24.    [facility]/25/2024    11:45 AM   Demographics   Marital Status    Patient completed AMD 24 naming her son Tim as primary and his daughter Lluvia as secondary HCDM.    Patient / Family Encounter Documentation    Participants (names): Jeni Melara, Lluvia Gilbert, granddaughter, Dr. Martinez, Rhonda George, [facility]    Narrative: Palliative team met with patient and granddaughter. Reviewed her medical issues and goals for care. Patient stated \"No CPR\" and she does not want a permanent pacemaker. She was advised to follow up with her cardiologist OP.     Psychosocial Issues Identified/ Resilience Factors: Ms. Melara is a . She has 2 sons and 1 daughter Tim( local) Brad Melgar( lives in [place]) and Mónica (Lives in [place] not  in touch with her mom ). Patient was living independently in [place] in her own home until 2023, when she

## 2024-06-25 NOTE — ACP (ADVANCE CARE PLANNING)
Advance Care Planning  (See also Dr. Martinez's notes.)    General Advance Care Planning (ACP) Conversation    Date of Conversation: 6/25/2024  Conducted with: Patient with Decision Making Capacity  Other persons present: Granddaughter Dr. Juan Teixeira    Healthcare Decision Maker:   Primary Decision Maker: Tim Melara - Child - [phone]    Secondary Decision Maker: Lluvia Gilbert - Grandchild - [phone]  Click here to complete Healthcare Decision Makers including selection of the Healthcare Decision Maker Relationship (ie \"Primary\").   Today we documented Decision Maker(s) consistent with ACP documents on file.    Content/Action Overview:  Has ACP document(s) on file - reflects the patient's care preferences  Reviewed DNR/DNI and patient confirms current DNR status - completed forms on file (place new order if needed)  resuscitation preferences    Ms. Melara indicated that \"I do not want any treatments to prolong my life.\"    Patient would consider temporary pacemaker, but not permanent.    DDNR was signed with Dr. Martinez on this date and copied for chart.    Ms. Melara indicated that she wishes to make anatomical gifts if she is eligible.    Length of Voluntary ACP Conversation in minutes:  <16 minutes (Non-Billable)    Priscilla George LCSW

## 2024-06-25 NOTE — PLAN OF CARE
Problem: Occupational Therapy - Adult  Goal: By Discharge: Performs self-care activities at highest level of function for planned discharge setting.  See evaluation for individualized goals.  Description: FUNCTIONAL STATUS PRIOR TO ADMISSION:  Patient was independent in ADLs and functional mobility.    ,  ,  ,  ,  ,  ,  ,  ,  ,  ,       HOME SUPPORT: Patient lived alone on independent side of [facility].    Occupational Therapy Goals:  Initiated 6/22/2024  1.  Patient will perform grooming while incorporating RUE with Stand by Assist within 7 day(s).  2.  Patient will perform upper body dressing with Minimal Assist within 7 day(s).  3.  Patient will perform lower body dressing with Moderate Assist within 7 day(s).  4.  Patient will perform toilet transfers with Minimal Assist  within 7 day(s).  5.  Patient will perform all aspects of toileting with Minimal Assist within 7 day(s).  6.  Patient will participate in upper extremity therapeutic exercise/activities with Stand by Assist within 7 day(s).    7.  Patient will utilize energy conservation techniques during functional activities with no cues within 7 day(s).  8.  Patient will improve their Fugl Barton score by 5 points in prep for ADLs within 7 days.      Outcome: Progressing     OCCUPATIONAL THERAPY TREATMENT  Patient: Jeni Melara (89 y.o. female)  Date: 6/25/2024  Primary Diagnosis: Age-related physical debility [R54]  CVA (cerebrovascular accident due to intracerebral hemorrhage) (HCC) [I61.9]       Precautions: Fall Risk, Bed Alarm                Chart, occupational therapy assessment, plan of care, and goals were reviewed.    ASSESSMENT  Patient continues to benefit from skilled OT services and is progressing towards goals as she completed transfer to chair with increased assistance & multimodal cueing for weight shift to advance RLE, motivated to complete without stephanie howe this date. Prior to transfer, patient completed EOB LB ADL with fair sitting  alarm activated, and Updated patient's board on functional status and mobility recommendations    COMMUNICATION/EDUCATION:   The patient's plan of care was discussed with: physical therapist, registered nurse, and neuro NP    Patient Education  Education Given To: Patient  Education Provided: Role of Therapy;Plan of Care;Precautions;ADL Adaptive Strategies;Transfer Training;Mobility Training;Fall Prevention Strategies;Home Exercise Program  Education Provided Comments: RUE positioning/AAROM  Education Method: Demonstration;Verbal;Teach Back  Barriers to Learning: Hearing;Cognition  Education Outcome: Verbalized understanding;Continued education needed;Demonstrated understanding    Thank you for this referral.  Iliana Rice OT  Minutes: 40

## 2024-06-25 NOTE — ACP (ADVANCE CARE PLANNING)
GOALS OF CARE.  Patient is able to make her decisions, her grand daughter Lluvia is at bed side .  Discharge to rehab, family is considering long-term care after that because she would not be able to go back to independent or assisted living as she is very weak and unable to walk.  She would like to discuss with cardiologist, may consider temporary pacemaker if she has recurrent episode of symptomatic bradycardia.       TREATMENT PREFERENCES:   Code Status: DNR, patient has signed DDNR           Advance Care Planning:    Primary Decision Maker: Tim Melara - Child - [phone]    Secondary Decision Maker: Lluvia Gilbert - Grandchild - [phone]    Patient completed Advance directives with us today, she would not like to prolong her life with artificial means if she is imminent or comatose/unable to interact with surroundings.

## 2024-06-25 NOTE — PROGRESS NOTES
Hospitalist Progress Note    NAME:   Jeni Melara   : 1928   MRN: [id number]     Date/Time: 2024 3:40 PM  Patient PCP: No primary care provider on file.    Estimated discharge date:   Barriers: Blood pressure control   will need IPR    Assessment / Plan:    Acute CVA  dysarthria, Right facial droop  Continue telemetry  Appreciate neurology recs  CT head unremarkable  MRI Acute infarction involving corona radiata, extending inferiorly to the  posterior lentiform nucleus.  2. Moderate atrophy with abundant chronic small vessel ischemic disease in the  white matter.  Sbp  goal <140/90  CTA head and neck pending  LDL >96, started statin 40mg daily  A1c 5.4  -PT OT eval-IPr  Speech eval puréed diet  Echo normal EF 60-65  CTA head and neck moderate stenosis right A1 origin, 35 mm aneurysm descending aorta thoracic, ongoing subacute ischemia left corona radiator    Discussed with neurology and discussed with patient's granddaughter rick  Explained risk versus benefit regarding Eliquis and aspirin  Decision to start on Eliquis to prevent stroke  DC aspirin      Pafib- new onset  Appreciate card consult  Episodes of bradycardia on tele  Was briefly on Cardizem drip which was discontinued  HR 60s on monitor  Will hold off adding bb/cardizem scheduled for now  Can use iv low dose cardizem if HR > 120 and sustaining  EP evaluated the patient not a candidate for any pacemaker.  Cleared from EP standpoint for discharge  EP recommended palliative consult-discussed with Dr. Wan, who will talk with family        Rt shoulder pain  Chronic per family  Xray shoulder no acute findings      Status post 4 and right knee pain, on admission   From [facility]  Generalized debility  Chest x-ray, UA unremarkable  CT head negative  Continue fall precautions  Orthostatics blood pressure  PT OT eval-ipr    Mild hyponatremia resolved    incidental CT abd/pel for distention findings of unknown chronicity as pt  denies any recent imaging, moved [place]:  -Mild wall thickening and inflammation of R colon sugg of mild or early colitis  -Thoracoabdominal aortic aneurysm, aorta up to 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 Hemorrhagic or proteinaceous cyst, neoplasm not excluded  -5mm RLL nodule    -Vascular consulted not a surgical candidate no need for follow-up given her age    Chronic Conditions:  HTN, I  Resumed on losartan  Hydralazine as needed  Blood pressure uncontrolled  Increase losartan from 50- to 100 mg  Added amlodipine 5 mg    Medical Decision Making:   I personally reviewed labs:cbc bmp  I personally reviewed imaging: echo  I personally reviewed EKG: Heart rate in 60  Toxic drug monitoring:   Discussed case with: ptkalie daughterneuro        Code Status: DNR  DVT Prophylaxis: Lovenox  GI Prophylaxis:    Subjective:     Chief Complaint / Reason for Physician Visit  Patient sitting comfortably no acute complaints no overnight events.  Discussed with neurology and granddaughter and decision to start on Eliquis.  Blood pressure uncontrolled.      Objective:     VITALS:   Last 24hrs VS reviewed since prior progress note. Most recent are:  Patient Vitals for the past 24 hrs:   BP Temp Temp src Pulse Resp SpO2 Weight   06/25/24 1504 (!) 202/90 98.8 °F (37.1 °C) Oral 68 -- -- --   06/25/24 1150 (!) 187/74 98.6 °F (37 °C) Oral 63 -- 92 % --   06/25/24 0845 (!) 193/80 -- -- -- -- -- --   06/25/24 0742 (!) 193/80 98.6 °F (37 °C) Oral 63 18 94 % --   06/25/24 0514 -- -- -- -- -- -- 59.8 kg (131 lb 13.4 oz)   06/25/24 0227 (!) 157/67 98.2 °F (36.8 °C) Oral 55 16 95 % --   06/24/24 2259 (!) 123/59 98 °F (36.7 °C) Oral 62 18 96 % --   06/24/24 1937 (!) 136/112 98.2 °F (36.8 °C) Oral 64 18 95 % --           Intake/Output Summary (Last 24 hours) at 6/25/2024 1540  Last data filed at 6/25/2024 0226  Gross per 24 hour   Intake 602.01 ml   Output 900 ml   Net -297.99 ml

## 2024-06-25 NOTE — PLAN OF CARE
With: Alone  Type of Home: Apartment (The patient resides in independent living at [facility])  Home Layout: One level  Home Access: Level entry  Bathroom Shower/Tub: Walk-in shower  Home Equipment: None  Active : No  Patient's  Info: The patient's son asssits with transportation needs  Mode of Transportation: Car    Baseline Assessment:                Cognitive and Communication Status:  Neurologic State: Alert  Orientation Level: Oriented to person and Oriented to place  Cognition: Follows commands    Dysphagia:  Oral Assessment:     P.O. Trials:  PO Trials  Assessment Method(s): Observation  Patient Position: upright in chair  Vocal Quality: No Impairment  Consistency Presented: Thin  How Presented: Self-fed/presented;Cup/sip  Bolus Acceptance: No impairment  Bolus Formation/Control: No impairment  Aspiration Signs/Symptoms: None  Pharyngeal Phase Characteristics: No impairment, issues, or problems            Motor Speech:  Motor Speech  Dysarthric Characteristics: Imprecise;Blended word boundaries  Intelligibility: Impaired      Language Comprehension and Expression:                   Neuro-Linguistics:                      Voice:       Respiratory Status/Airway:  Room air                               After treatment:   Patient left in no apparent distress sitting up in chair, Nursing notified, and Caregiver present    COMMUNICATION/EDUCATION:   Patient was educated regarding role of SLP, aspiration precautions, and motor speech strategies.     The patient's plan of care including recommendations, planned interventions, and recommended diet changes were discussed with: Registered nurse    Patient/family agree to work toward stated goals and plan of care    Thank you,  BEN Blas  Minutes: 16    Problem: SLP Adult - Impaired Swallowing  Goal: By Discharge: Advance to least restrictive diet without signs or symptoms of aspiration for planned discharge setting.  See evaluation for  individualized goals.  Description: Speech therapy goals  Initiated 6/22/2024   1. Patient will tolerate puree/thin liquid diet without s/s of aspiration or adverse effects within 7 days   2. Patient will tolerate solid trials with timely and complete mastication and full oral clearance within 7 days   3. Added 6/24/2024 1. Patient will use speech intelligibility strategies given minimal verbal cues to be 90% intelligible in conversation with minimal articulation distortion within 7 days.   Outcome: Progressing

## 2024-06-26 VITALS
DIASTOLIC BLOOD PRESSURE: 74 MMHG | HEART RATE: 71 BPM | SYSTOLIC BLOOD PRESSURE: 116 MMHG | HEIGHT: 55 IN | TEMPERATURE: 98.1 F | BODY MASS INDEX: 30.45 KG/M2 | WEIGHT: 131.6 LBS | OXYGEN SATURATION: 97 % | RESPIRATION RATE: 16 BRPM

## 2024-06-26 LAB
ANION GAP SERPL CALC-SCNC: 7 MMOL/L (ref 5–15)
BASOPHILS # BLD: 0.1 K/UL (ref 0–0.1)
BASOPHILS NFR BLD: 1 % (ref 0–1)
BUN SERPL-MCNC: 24 MG/DL (ref 6–20)
BUN/CREAT SERPL: 22 (ref 12–20)
CALCIUM SERPL-MCNC: 9.3 MG/DL (ref 8.5–10.1)
CHLORIDE SERPL-SCNC: 109 MMOL/L (ref 97–108)
CO2 SERPL-SCNC: 23 MMOL/L (ref 21–32)
CREAT SERPL-MCNC: 1.09 MG/DL (ref 0.55–1.02)
DIFFERENTIAL METHOD BLD: ABNORMAL
EOSINOPHIL # BLD: 0.2 K/UL (ref 0–0.4)
EOSINOPHIL NFR BLD: 3 % (ref 0–7)
ERYTHROCYTE [DISTWIDTH] IN BLOOD BY AUTOMATED COUNT: 13.5 % (ref 11.5–14.5)
GLUCOSE BLD STRIP.AUTO-MCNC: 104 MG/DL (ref 65–117)
GLUCOSE BLD STRIP.AUTO-MCNC: 107 MG/DL (ref 65–117)
GLUCOSE SERPL-MCNC: 106 MG/DL (ref 65–100)
HCT VFR BLD AUTO: 32.9 % (ref 35–47)
HGB BLD-MCNC: 10.9 G/DL (ref 11.5–16)
IMM GRANULOCYTES # BLD AUTO: 0.1 K/UL (ref 0–0.04)
IMM GRANULOCYTES NFR BLD AUTO: 1 % (ref 0–0.5)
LYMPHOCYTES # BLD: 1.8 K/UL (ref 0.8–3.5)
LYMPHOCYTES NFR BLD: 26 % (ref 12–49)
MCH RBC QN AUTO: 28.2 PG (ref 26–34)
MCHC RBC AUTO-ENTMCNC: 33.1 G/DL (ref 30–36.5)
MCV RBC AUTO: 85.2 FL (ref 80–99)
MONOCYTES # BLD: 0.6 K/UL (ref 0–1)
MONOCYTES NFR BLD: 9 % (ref 5–13)
NEUTS SEG # BLD: 4.4 K/UL (ref 1.8–8)
NEUTS SEG NFR BLD: 60 % (ref 32–75)
NRBC # BLD: 0 K/UL (ref 0–0.01)
NRBC BLD-RTO: 0 PER 100 WBC
PLATELET # BLD AUTO: 289 K/UL (ref 150–400)
PMV BLD AUTO: 9.4 FL (ref 8.9–12.9)
POTASSIUM SERPL-SCNC: 3.8 MMOL/L (ref 3.5–5.1)
RBC # BLD AUTO: 3.86 M/UL (ref 3.8–5.2)
SERVICE CMNT-IMP: NORMAL
SERVICE CMNT-IMP: NORMAL
SODIUM SERPL-SCNC: 139 MMOL/L (ref 136–145)
WBC # BLD AUTO: 7.1 K/UL (ref 3.6–11)

## 2024-06-26 PROCEDURE — 80048 BASIC METABOLIC PNL TOTAL CA: CPT

## 2024-06-26 PROCEDURE — 2580000003 HC RX 258: Performed by: STUDENT IN AN ORGANIZED HEALTH CARE EDUCATION/TRAINING PROGRAM

## 2024-06-26 PROCEDURE — 6370000000 HC RX 637 (ALT 250 FOR IP): Performed by: NURSE PRACTITIONER

## 2024-06-26 PROCEDURE — 85025 COMPLETE CBC W/AUTO DIFF WBC: CPT

## 2024-06-26 PROCEDURE — 82962 GLUCOSE BLOOD TEST: CPT

## 2024-06-26 PROCEDURE — 97535 SELF CARE MNGMENT TRAINING: CPT

## 2024-06-26 PROCEDURE — 97112 NEUROMUSCULAR REEDUCATION: CPT

## 2024-06-26 PROCEDURE — 36415 COLL VENOUS BLD VENIPUNCTURE: CPT

## 2024-06-26 PROCEDURE — 6370000000 HC RX 637 (ALT 250 FOR IP): Performed by: INTERNAL MEDICINE

## 2024-06-26 RX ORDER — AMLODIPINE BESYLATE 5 MG/1
5 TABLET ORAL DAILY
Qty: 30 TABLET | Refills: 3 | Status: SHIPPED | OUTPATIENT
Start: 2024-06-27

## 2024-06-26 RX ORDER — ROSUVASTATIN CALCIUM 40 MG/1
40 TABLET, COATED ORAL NIGHTLY
Qty: 30 TABLET | Refills: 3 | Status: SHIPPED | OUTPATIENT
Start: 2024-06-26

## 2024-06-26 RX ORDER — LOSARTAN POTASSIUM 100 MG/1
100 TABLET ORAL DAILY
Qty: 30 TABLET | Refills: 3 | Status: SHIPPED | OUTPATIENT
Start: 2024-06-27

## 2024-06-26 RX ADMIN — ACETAMINOPHEN 650 MG: 325 TABLET ORAL at 05:33

## 2024-06-26 RX ADMIN — SODIUM CHLORIDE, PRESERVATIVE FREE 10 ML: 5 INJECTION INTRAVENOUS at 08:44

## 2024-06-26 RX ADMIN — LOSARTAN POTASSIUM 100 MG: 100 TABLET, FILM COATED ORAL at 08:43

## 2024-06-26 RX ADMIN — AMLODIPINE BESYLATE 5 MG: 5 TABLET ORAL at 08:43

## 2024-06-26 RX ADMIN — APIXABAN 2.5 MG: 2.5 TABLET, FILM COATED ORAL at 08:43

## 2024-06-26 RX ADMIN — SERTRALINE HYDROCHLORIDE 50 MG: 50 TABLET ORAL at 08:43

## 2024-06-26 ASSESSMENT — PAIN SCALES - GENERAL
PAINLEVEL_OUTOF10: 0
PAINLEVEL_OUTOF10: 0

## 2024-06-26 NOTE — CARE COORDINATION
Patient is clear from a CM standpoint.    [facility]: [phone]     Transition of Care Plan: [facility]F, then return to [facility]     RUR: 13% (low RUR)   Prior Level of Functioning: Independent in her ADLs. Her family assisted with some IADLs.  Disposition: [facility]F, then return to [facility]  If SNF or IPR: Date FOC offered: 6/22/24  Date FOC received: 6/22/24 - [facility] and [facility]  Accepting facility: [facility]  Date authorization started with reference number: 6/25, Ref # unknown  Date authorization received and expires: 6/25-TBD  Follow up appointments: defer to SNF.  DME needed: defer to SNF.  Transportation at discharge: [facility] at 12pm on 6/26/2024 to [facility], bed 314, nursing call report to: [phone]. [facility] PCS, FS, Inpt DNR, H&P and completed transportation folder left on bedside chart.  Completed [facility] magnet left outside room.  IM/IMM Medicare/ letter given: 2nd IM done 6/25/2024.  Is patient a  and connected with [facility]? No              If yes, was [place] transfer form completed and VA notified? N/A  Caregiver Contact: Tim Melara - Son - [phone] - [email or web address]; Lluvia Gilbert - GDTR - [phone] - [email or web address]  Discharge Caregiver contacted prior to discharge? Aware of pending discharge.  Care Conference needed? No  Barriers to discharge: None.    0856 - [facility] notified of transportation at 12pm via Epic message.    1036 to 1038 - Attending notified during IDRs that transport is at 12pm for discharge today.  CM contacted Nyla at [facility] who is aware of transport at 12pm, confirmed she met with patient this morning and said patient can go into bed 314 at [facility].  CM contacted Tim who is aware of discharge plan to [facility] today; Tim is aware of facility address and bed number.  RN notified that patient is clear from a CM standpoint.  Patient notified of plan.    Transition of Care Plan to  Completed    []UAI Completed and copy given to pt/family  and copy given to pt/family  []A screening has previously been completed.    []Level II Completed    [] Private pay individual who will not become   financially eligible for Medicaid within 6 months from admission to a [facility].     [] Individual refused to have screening conducted.     []Medicaid Application Completed    [x]The screening denied because it was determined individual did not need/did not qualify for nursing facility level of care.  [] Out of state residents seeking direct admission to a [facility] nursing facility.  [] Individuals who are inpatients of an out of state hospital, or in state or out of state veterans/ hospital and seek direct admission to a VA nursing facility  [] Individuals who are pateints or residents of a state owned/operated facility that is licensed by Department of Behavioral Services (DBHDS) and seek direct admission to VA nursing facility  [] A screening not required for enrollment in Medicaid Hospice services as set out in 12 VAC 30-  [] [facility] staff shall perform screenings of the [facility] clients.    Advanced Care Plan:  []Surrogate Decision Maker of Care  []POA  [x]Communicated Code Status and copy sent.    Other:             06/26/24 1039   Services At/After Discharge   Transition of Care Consult (CM Consult) SNF   Services At/After Discharge PT;OT;Nursing services   Mode of Transport at Discharge Other (see comment)  ([facility])   Hospital Transport Time of Discharge 1200   Confirm Follow Up Transport Family   Condition of Participation: Discharge Planning   The Plan for Transition of Care is related to the following treatment goals: Return to [facility] after SNF   The Patient and/or Patient Representative was provided with a Choice of Provider? Patient Representative;Patient   Name of the Patient Representative who was provided with the Choice of Provider and agrees

## 2024-06-26 NOTE — PLAN OF CARE
Problem: Discharge Planning  Goal: Discharge to home or other facility with appropriate resources  6/26/2024 0906 by Mamie Mckinnon RN  Outcome: Progressing  6/25/2024 2103 by Jarrod Cline RN  Outcome: Progressing     Problem: Skin/Tissue Integrity  Goal: Absence of new skin breakdown  Description: 1.  Monitor for areas of redness and/or skin breakdown  2.  Assess vascular access sites hourly  3.  Every 4-6 hours minimum:  Change oxygen saturation probe site  4.  Every 4-6 hours:  If on nasal continuous positive airway pressure, respiratory therapy assess nares and determine need for appliance change or resting period.  6/26/2024 0906 by Mamie Mckinnon RN  Outcome: Progressing  6/25/2024 2103 by Jarrod Cline RN  Outcome: Progressing     Problem: Safety - Adult  Goal: Free from fall injury  6/26/2024 0906 by Mamie Mckinnon RN  Outcome: Progressing  6/25/2024 2103 by Jarrod Cline RN  Outcome: Progressing     Problem: ABCDS Injury Assessment  Goal: Absence of physical injury  6/26/2024 0906 by Mamie Mckinnon RN  Outcome: Progressing  6/25/2024 2103 by Jarrod Cline RN  Outcome: Progressing     Problem: Neurosensory - Adult  Goal: Achieves stable or improved neurological status  Outcome: Progressing  Goal: Absence of seizures  Outcome: Progressing  Goal: Remains free of injury related to seizures activity  Outcome: Progressing  Goal: Achieves maximal functionality and self care  Outcome: Progressing     Problem: Pain  Goal: Verbalizes/displays adequate comfort level or baseline comfort level  Outcome: Progressing     Problem: Chronic Conditions and Co-morbidities  Goal: Patient's chronic conditions and co-morbidity symptoms are monitored and maintained or improved  Outcome: Progressing     Problem: Respiratory - Adult  Goal: Achieves optimal ventilation and oxygenation  Outcome: Progressing     Problem: Cardiovascular - Adult  Goal: Maintains optimal cardiac output and hemodynamic

## 2024-06-26 NOTE — PROGRESS NOTES
Bedside shift change report given to kaylie (oncoming nurse) by rocky (offgoing nurse). Report included the following information Nurse Handoff Report, Adult Overview, MAR, and Cardiac Rhythm thom .     End of Shift Note    Bedside shift change report given to day shift (oncoming nurse) by KAYLIE PARIKH RN (offgoing nurse).  Report included the following information SBAR, ED Summary, MAR, Recent Results, and Cardiac Rhythm thom    Shift worked:  7p-7a     Shift summary and any significant changes:     na     Concerns for physician to address:  na     Zone phone for oncoming shift:          Activity:     Number times ambulated in hallways past shift: 0  Number of times OOB to chair past shift: 0    Cardiac:   Cardiac Monitoring: Yes           Access:  Current line(s): PIV     Genitourinary:   Urinary status: voiding    Respiratory:      Chronic home O2 use?: NO  Incentive spirometer at bedside: YES       GI:     Current diet:  ADULT DIET; Dysphagia - Pureed  Passing flatus: YES  Tolerating current diet: YES       Pain Management:   Patient states pain is manageable on current regimen: YES    Skin:     Interventions: increase time out of bed    Patient Safety:  Fall Score:    Interventions: bed/chair alarm       Length of Stay:  Expected LOS: 5  Actual LOS: 2      KAYLIE PARIKH RN

## 2024-06-26 NOTE — PLAN OF CARE
Therapy Goals  Initiated 6/22/2024  1.  Patient will move from supine to sit and sit to supine, scoot up and down, and roll side to side in bed with minimal assistance within 7 day(s).    2.  Patient will perform sit to stand with minimal assistance within 7 day(s).  3.  Patient will transfer from bed to chair and chair to bed with minimal assistance using the least restrictive device within 7 day(s).  4.  Patient will ambulate with moderate assistance for 25 feet with the least restrictive device within 7 day(s).   5.  Patient will improve Martinez Balance score by 7 points within 7 days.         6/25/2024 1343 by Natalie Bates PT  Outcome: Progressing     Problem: Occupational Therapy - Adult  Goal: By Discharge: Performs self-care activities at highest level of function for planned discharge setting.  See evaluation for individualized goals.  Description: FUNCTIONAL STATUS PRIOR TO ADMISSION:  Patient was independent in ADLs and functional mobility.    ,  ,  ,  ,  ,  ,  ,  ,  ,  ,       HOME SUPPORT: Patient lived alone on independent side of [facility].    Occupational Therapy Goals:  Initiated 6/22/2024  1.  Patient will perform grooming while incorporating RUE with Stand by Assist within 7 day(s).  2.  Patient will perform upper body dressing with Minimal Assist within 7 day(s).  3.  Patient will perform lower body dressing with Moderate Assist within 7 day(s).  4.  Patient will perform toilet transfers with Minimal Assist  within 7 day(s).  5.  Patient will perform all aspects of toileting with Minimal Assist within 7 day(s).  6.  Patient will participate in upper extremity therapeutic exercise/activities with Stand by Assist within 7 day(s).    7.  Patient will utilize energy conservation techniques during functional activities with no cues within 7 day(s).  8.  Patient will improve their Fugl Barton score by 5 points in prep for ADLs within 7 days.      6/25/2024 1018 by Iliana Rice, OT  Outcome:  Progressing     Problem: SLP Adult - Impaired Swallowing  Goal: By Discharge: Advance to least restrictive diet without signs or symptoms of aspiration for planned discharge setting.  See evaluation for individualized goals.  Description: Speech therapy goals  Initiated 6/22/2024   1. Patient will tolerate puree/thin liquid diet without s/s of aspiration or adverse effects within 7 days   2. Patient will tolerate solid trials with timely and complete mastication and full oral clearance within 7 days   3. Added 6/24/2024 1. Patient will use speech intelligibility strategies given minimal verbal cues to be 90% intelligible in conversation with minimal articulation distortion within 7 days.   6/25/2024 1513 by Anastacia Kingston, SLP  Outcome: Progressing     Problem: Neurosensory - Adult  Goal: Absence of seizures  6/25/2024 0823 by Niurka Mejia, RN  Outcome: Progressing  Goal: Remains free of injury related to seizures activity  6/25/2024 0823 by Niurka Mejia, RN  Outcome: Progressing  Goal: Achieves maximal functionality and self care  6/25/2024 0823 by Niurka Mejia, RN  Outcome: Progressing

## 2024-06-26 NOTE — PROGRESS NOTES
0700: Bedside and Verbal shift change report given to Mamie Mckinnon RN (oncoming nurse) by LEN Garay (offgoing nurse). Report included the following information Nurse Handoff Report, Index, Intake/Output, MAR, Recent Results, and Cardiac Rhythm Sinus Elias  .     1245: Attempted to call report to [facility], no answer.    1254: [facility].     1300:     Discharge Summary     Patient: Jeni Melara MRN: [id number]  SSN: [id number]    YOB: 1928  Age: 89 y.o.  Sex: female       Code Status: DNR  Allergies: No Known Allergies    Admit Date: 6/21/2024    Discharge Date: 6/26/2024      Admission Diagnoses: Age-related physical debility [R54]  CVA (cerebrovascular accident due to intracerebral hemorrhage) (HCC) [I61.9]    PMH: History reviewed. No pertinent past medical history.    Social History:  Social history   Social History     Tobacco Use    Smoking status: Never    Smokeless tobacco: Never   Substance Use Topics    Alcohol use: Not on file     Drug History   Social History     Substance and Sexual Activity   Drug Use Not on file     Familiy History No family history on file.    Consults: None    Significant Diagnostic Studies:     Discharge Condition: Good    Disposition: SNF    Discharge Medications:   Current Discharge Medication List        START taking these medications    Details   losartan (COZAAR) 100 MG tablet Take 1 tablet by mouth daily  Qty: 30 tablet, Refills: 3      apixaban (ELIQUIS) 2.5 MG TABS tablet Take 1 tablet by mouth 2 times daily  Qty: 180 tablet, Refills: 1      rosuvastatin (CRESTOR) 40 MG tablet Take 1 tablet by mouth nightly  Qty: 30 tablet, Refills: 3      amLODIPine (NORVASC) 5 MG tablet Take 1 tablet by mouth daily  Qty: 30 tablet, Refills: 3           CONTINUE these medications which have NOT CHANGED    Details   sertraline (ZOLOFT) 50 MG tablet Take 1 tablet by mouth daily      traZODone (DESYREL) 50 MG tablet Take

## 2024-06-26 NOTE — DISCHARGE SUMMARY
Hospitalist Discharge Summary     Patient ID:  Jeni Melara  [id number]  89 y.o.  7/7/1928 6/21/2024    PCP on record: No primary care provider on file.    Admit date: 6/21/2024  Discharge date and time: 6/26/2024    DISCHARGE DIAGNOSIS:  Acute CVA  dysarthria, Right facial droop  Pafib- new onset  Rt shoulder pain  Status post 4 and right knee pain, on admission   Mild hyponatremia resolved  Chronic Conditions:  HTN, I    CONSULTATIONS:  IP CONSULT TO CASE MANAGEMENT  IP CONSULT TO SOCIAL WORK  IP CONSULT TO VASCULAR SURGERY  IP CONSULT TO NEUROLOGY  IP CONSULT TO CARDIOLOGY    Excerpted HPI from H&P of Norma Garcia MD:  CHIEF COMPLAINT: general debility, recent fall at [facility]     HISTORY OF PRESENT ILLNESS:     Jeni Melara is a 89 y.o.  female with PMHx significant for HTN, insomnia, depression, recently diagnosed with lactose intolerance w/ diarrhea, now resolved s/p changing her diet. Patient came in via EMS from [facility] for GLF. Patient very [place], states was sweeping her patio recently outside, fell inside the house 6/20, today 6/21 called family 0500 and reported she couldn't get out of the chair, granddaughter and son at the bedside on exam. Son went to pt's apartment [facility] side, states later pt called again as she couldn't get up again from the chair, friend that lives there was called by pt, granddaughter, son couldn't get her out of the chair, worried for dehydration and possible UTI, and called EMS to bring to the hospital as couldn't get pt to stand, and pt reported pain all over. Denies diarrhea or abdominal pain, states has been having diarrhea, had UCC fecal test, and was instructed to lactose free milk and now symptoms have resolved. No c/o abd pain, reported generalized pain w/o injury throughout, has right temple bruising from fall 6/20. Lab and imaging results reviewed with pt and family at the bedside. They are asking for possible

## 2024-06-26 NOTE — PLAN OF CARE
Problem: Occupational Therapy - Adult  Goal: By Discharge: Performs self-care activities at highest level of function for planned discharge setting.  See evaluation for individualized goals.  Description: FUNCTIONAL STATUS PRIOR TO ADMISSION:  Patient was independent in ADLs and functional mobility.    ,  ,  ,  ,  ,  ,  ,  ,  ,  ,       HOME SUPPORT: Patient lived alone on independent side of [facility].    Occupational Therapy Goals:  Initiated 6/22/2024  1.  Patient will perform grooming while incorporating RUE with Stand by Assist within 7 day(s).  2.  Patient will perform upper body dressing with Minimal Assist within 7 day(s).  3.  Patient will perform lower body dressing with Moderate Assist within 7 day(s).  4.  Patient will perform toilet transfers with Minimal Assist  within 7 day(s).  5.  Patient will perform all aspects of toileting with Minimal Assist within 7 day(s).  6.  Patient will participate in upper extremity therapeutic exercise/activities with Stand by Assist within 7 day(s).    7.  Patient will utilize energy conservation techniques during functional activities with no cues within 7 day(s).  8.  Patient will improve their Fugl Barton score by 5 points in prep for ADLs within 7 days.      Outcome: Progressing   OCCUPATIONAL THERAPY TREATMENT  Patient: Jeni Melara (89 y.o. female)  Date: 6/26/2024  Primary Diagnosis: Age-related physical debility [R54]  CVA (cerebrovascular accident due to intracerebral hemorrhage) ([facility]) [I61.9]       Precautions: Fall Risk, Bed Alarm                Chart, occupational therapy assessment, plan of care, and goals were reviewed.    ASSESSMENT  Patient continues to benefit from skilled OT services and is progressing towards goals. Patient with good progress towards goals demonstrating improvements in bed mobility, sitting balance, and self care. She remains limited by R sided weakness, R shoulder pain, dysarthria and impaired word finding, and  standing>sitting  principles  Education Method: Demonstration;Verbal;Teach Back  Barriers to Learning: Hearing  Education Outcome: Verbalized understanding;Continued education needed;Demonstrated understanding    Thank you for this referral.  Dulce Anne OT  Minutes: 38

## 2024-06-26 NOTE — DISCHARGE INSTRUCTIONS
HOSPITALIST DISCHARGE INSTRUCTIONS    NAME: Jeni Melara   :  1928   MRN:  [id number]     Date/Time:  2024 11:55 AM    ADMIT DATE: 2024   DISCHARGE DATE: 2024     Attending Physician: Mahendra Deutsch MD  Acute CVA  dysarthria, Right facial droop  Pafib- new onset  Rt shoulder pain  Status post 4 and right knee pain, on admission   Mild hyponatremia resolved  Chronic Conditions:  HTN, I    Medications: Per above medication reconciliation.    Pain Management: per above medications    Recommended diet: cardiac diet    Recommended activity: activity as tolerated    Wound care: None    Indwelling devices:  None    Supplemental Oxygen: None    Required Lab work: Per SNF routine    Glucose management:  Accucheck ACHS with sliding scale per SNF protocol and None    Code status: DNR

## 2024-07-12 LAB
EKG DIAGNOSIS: NORMAL
EKG Q-T INTERVAL: 298 MS
EKG QRS DURATION: 122 MS
EKG QTC CALCULATION (BAZETT): 447 MS
EKG R AXIS: -9 DEGREES
EKG T AXIS: 176 DEGREES
EKG VENTRICULAR RATE: 135 BPM